# Patient Record
Sex: MALE | Race: WHITE | NOT HISPANIC OR LATINO | Employment: FULL TIME | URBAN - NONMETROPOLITAN AREA
[De-identification: names, ages, dates, MRNs, and addresses within clinical notes are randomized per-mention and may not be internally consistent; named-entity substitution may affect disease eponyms.]

---

## 2023-09-27 NOTE — PROGRESS NOTES
"Aneesh Loaiza is a 28 y.o. male who presents for Establish Care (Hx of high BP, not on medications at this time, would like to restart; discuss GERD medications; sleep study, waking up at night;)    HTN: previously on hydrochlorothiazide and amlodipine which he didn't like much. He checks his BP at home and his systolic has been around 145/90 on average.     GERD, h/o hiatal hernia repair (2017): On omeprazole otc which works well but he is requesting rx. He is getting breakthrough so he is considering increasing to BID. His hernia was undiagnosed for many years and so he developed esophageal ulcers.     Frequent waking, snoring: Sx getting worse over time. Strong Fhx of TYSHAWN and he wakes up a lot so he is hoping to get a sleep study to look into this.     Review of systems completed and unremarkable other than what is documented in HPI.     Social history: non-smoker, drinks alcohol rarely, he is working at [email or web address], he is paramedic in the ER  Medical history: GERD, HTN  Medications: omeprazole  SurgHx: cholecystectomy, hiatal hernia repair surgery  Fhx: father has heart disease, CONCHITA, DM, TYSHAWN, HTN, GERD, mom has HTN and GERD  Allergies: NKDA    Objective   /84 (BP Location: Right arm, Patient Position: Sitting, BP Cuff Size: Large adult)   Pulse 85   Ht 1.905 m (6' 3\")   Wt 122 kg (268 lb 3.2 oz)   SpO2 98%   BMI 33.52 kg/m²     Gen: No acute distress, alert and oriented x3, pleasant   HEENT: moist mucous membranes, b/l external auditory canals are clear of debris, TMs within normal limits, no oropharyngeal lesions, eomi, perrla   Neck: thyroid within normal limits, no lymphadenopathy   CV: RRR, normal S1/S2, no murmur   Resp: Clear to auscultation bilaterally, no wheezes or rhonchi appreciated  Abd: soft, nontender, non-distended, no guarding/rigidity, bowel sounds present  Extr: no edema, no calf tenderness  Derm: Skin is warm and dry, no rashes appreciated  Psych: mood is good, affect is congruent, " good hygiene, normal speech and eye contact  Neuro: cranial nerves grossly intact, normal gait    Assessment/Plan     #HTN:   Rx sent low dose amlodipine  Recheck in 6 mo    #GERD:   #Hiatal hernia s/p repair  Not controlled on otc omeprazole  Increasing the dosage    #Frequent waking  #snoring:   Ordering sleep study  Would want rx through [facility]    HCM:  Flu vaccine today

## 2023-10-05 ENCOUNTER — OFFICE VISIT (OUTPATIENT)
Dept: PRIMARY CARE | Facility: CLINIC | Age: 28
End: 2023-10-05
Payer: COMMERCIAL

## 2023-10-05 ENCOUNTER — LAB (OUTPATIENT)
Dept: LAB | Facility: LAB | Age: 28
End: 2023-10-05
Payer: COMMERCIAL

## 2023-10-05 VITALS
HEART RATE: 85 BPM | SYSTOLIC BLOOD PRESSURE: 131 MMHG | BODY MASS INDEX: 33.35 KG/M2 | OXYGEN SATURATION: 98 % | WEIGHT: 268.2 LBS | HEIGHT: 75 IN | DIASTOLIC BLOOD PRESSURE: 90 MMHG

## 2023-10-05 DIAGNOSIS — G47.19 EXCESSIVE DAYTIME SLEEPINESS: Primary | ICD-10-CM

## 2023-10-05 DIAGNOSIS — K21.9 GASTROESOPHAGEAL REFLUX DISEASE WITHOUT ESOPHAGITIS: ICD-10-CM

## 2023-10-05 DIAGNOSIS — I10 PRIMARY HYPERTENSION: ICD-10-CM

## 2023-10-05 DIAGNOSIS — Z23 ENCOUNTER FOR IMMUNIZATION: ICD-10-CM

## 2023-10-05 DIAGNOSIS — Z13.220 SCREENING FOR HYPERLIPIDEMIA: ICD-10-CM

## 2023-10-05 LAB
ALBUMIN SERPL BCP-MCNC: 4.7 G/DL (ref 3.4–5)
ALP SERPL-CCNC: 77 U/L (ref 33–120)
ALT SERPL W P-5'-P-CCNC: 23 U/L (ref 10–52)
ANION GAP SERPL CALC-SCNC: 13 MMOL/L (ref 10–20)
AST SERPL W P-5'-P-CCNC: 29 U/L (ref 9–39)
BASOPHILS # BLD AUTO: 0.07 X10*3/UL (ref 0–0.1)
BASOPHILS NFR BLD AUTO: 0.8 %
BILIRUB SERPL-MCNC: 0.5 MG/DL (ref 0–1.2)
BUN SERPL-MCNC: 6 MG/DL (ref 6–23)
CALCIUM SERPL-MCNC: 9.8 MG/DL (ref 8.6–10.3)
CHLORIDE SERPL-SCNC: 100 MMOL/L (ref 98–107)
CHOLEST SERPL-MCNC: 193 MG/DL (ref 0–199)
CHOLESTEROL/HDL RATIO: 5.8
CO2 SERPL-SCNC: 30 MMOL/L (ref 21–32)
CREAT SERPL-MCNC: 0.85 MG/DL (ref 0.5–1.3)
EOSINOPHIL # BLD AUTO: 0.12 X10*3/UL (ref 0–0.7)
EOSINOPHIL NFR BLD AUTO: 1.3 %
ERYTHROCYTE [DISTWIDTH] IN BLOOD BY AUTOMATED COUNT: 12.2 % (ref 11.5–14.5)
GFR SERPL CREATININE-BSD FRML MDRD: >90 ML/MIN/1.73M*2
GLUCOSE SERPL-MCNC: 94 MG/DL (ref 74–99)
HCT VFR BLD AUTO: 44.6 % (ref 41–52)
HDLC SERPL-MCNC: 33.4 MG/DL
HGB BLD-MCNC: 14.4 G/DL (ref 13.5–17.5)
IMM GRANULOCYTES # BLD AUTO: 0.02 X10*3/UL (ref 0–0.7)
IMM GRANULOCYTES NFR BLD AUTO: 0.2 % (ref 0–0.9)
LDLC SERPL CALC-MCNC: 107 MG/DL (ref 110–150)
LYMPHOCYTES # BLD AUTO: 2.71 X10*3/UL (ref 1.2–4.8)
LYMPHOCYTES NFR BLD AUTO: 29.1 %
MCH RBC QN AUTO: 26.6 PG (ref 26–34)
MCHC RBC AUTO-ENTMCNC: 32.3 G/DL (ref 32–36)
MCV RBC AUTO: 82 FL (ref 80–100)
MONOCYTES # BLD AUTO: 0.64 X10*3/UL (ref 0.1–1)
MONOCYTES NFR BLD AUTO: 6.9 %
NEUTROPHILS # BLD AUTO: 5.74 X10*3/UL (ref 1.2–7.7)
NEUTROPHILS NFR BLD AUTO: 61.7 %
NON HDL CHOLESTEROL: 160 MG/DL (ref 0–149)
NRBC BLD-RTO: 0 /100 WBCS (ref 0–0)
PLATELET # BLD AUTO: 222 X10*3/UL (ref 150–450)
PMV BLD AUTO: 11.4 FL (ref 7.5–11.5)
POTASSIUM SERPL-SCNC: 3.9 MMOL/L (ref 3.5–5.3)
PROT SERPL-MCNC: 7.7 G/DL (ref 6.4–8.2)
RBC # BLD AUTO: 5.42 X10*6/UL (ref 4.5–5.9)
SODIUM SERPL-SCNC: 139 MMOL/L (ref 136–145)
TRIGL SERPL-MCNC: 262 MG/DL (ref 0–149)
VLDL: 52 MG/DL (ref 0–40)
WBC # BLD AUTO: 9.3 X10*3/UL (ref 4.4–11.3)

## 2023-10-05 PROCEDURE — 85025 COMPLETE CBC W/AUTO DIFF WBC: CPT

## 2023-10-05 PROCEDURE — 3075F SYST BP GE 130 - 139MM HG: CPT | Performed by: FAMILY MEDICINE

## 2023-10-05 PROCEDURE — 36415 COLL VENOUS BLD VENIPUNCTURE: CPT

## 2023-10-05 PROCEDURE — 80061 LIPID PANEL: CPT

## 2023-10-05 PROCEDURE — 90471 IMMUNIZATION ADMIN: CPT | Performed by: FAMILY MEDICINE

## 2023-10-05 PROCEDURE — 3080F DIAST BP >= 90 MM HG: CPT | Performed by: FAMILY MEDICINE

## 2023-10-05 PROCEDURE — 99204 OFFICE O/P NEW MOD 45 MIN: CPT | Performed by: FAMILY MEDICINE

## 2023-10-05 PROCEDURE — 90686 IIV4 VACC NO PRSV 0.5 ML IM: CPT | Performed by: FAMILY MEDICINE

## 2023-10-05 PROCEDURE — 80053 COMPREHEN METABOLIC PANEL: CPT

## 2023-10-05 RX ORDER — OMEPRAZOLE 20 MG/1
20 TABLET, DELAYED RELEASE ORAL
COMMUNITY
End: 2024-04-05 | Stop reason: WASHOUT

## 2023-10-05 RX ORDER — AMLODIPINE BESYLATE 5 MG/1
5 TABLET ORAL DAILY
Qty: 90 TABLET | Refills: 3 | Status: SHIPPED | OUTPATIENT
Start: 2023-10-05 | End: 2024-10-04

## 2023-10-05 RX ORDER — OMEPRAZOLE 40 MG/1
40 CAPSULE, DELAYED RELEASE ORAL
Qty: 90 CAPSULE | Refills: 3 | Status: SHIPPED | OUTPATIENT
Start: 2023-10-05 | End: 2024-10-04

## 2023-10-05 NOTE — PATIENT INSTRUCTIONS
[facility] [phone]    Sleep Medicine:  Alicia Valenzuela () [phone]  Mik Augustin ([facility]) [phone]

## 2024-01-02 DIAGNOSIS — Z31.69 INFERTILITY COUNSELING: Primary | ICD-10-CM

## 2024-03-29 DIAGNOSIS — R06.83 SNORING: Primary | ICD-10-CM

## 2024-04-05 ENCOUNTER — OFFICE VISIT (OUTPATIENT)
Dept: PRIMARY CARE | Facility: CLINIC | Age: 29
End: 2024-04-05
Payer: COMMERCIAL

## 2024-04-05 ENCOUNTER — LAB (OUTPATIENT)
Dept: LAB | Facility: LAB | Age: 29
End: 2024-04-05
Payer: COMMERCIAL

## 2024-04-05 VITALS
WEIGHT: 271 LBS | BODY MASS INDEX: 33.87 KG/M2 | DIASTOLIC BLOOD PRESSURE: 82 MMHG | HEART RATE: 75 BPM | SYSTOLIC BLOOD PRESSURE: 124 MMHG

## 2024-04-05 DIAGNOSIS — R68.82 LOW LIBIDO: Primary | ICD-10-CM

## 2024-04-05 DIAGNOSIS — R68.82 LOW LIBIDO: ICD-10-CM

## 2024-04-05 LAB
IRON SERPL-MCNC: 30 UG/DL (ref 35–150)
TSH SERPL-ACNC: 2.64 MIU/L (ref 0.44–3.98)

## 2024-04-05 PROCEDURE — 36415 COLL VENOUS BLD VENIPUNCTURE: CPT

## 2024-04-05 PROCEDURE — 84443 ASSAY THYROID STIM HORMONE: CPT

## 2024-04-05 PROCEDURE — 1036F TOBACCO NON-USER: CPT | Performed by: FAMILY MEDICINE

## 2024-04-05 PROCEDURE — 83540 ASSAY OF IRON: CPT

## 2024-04-05 PROCEDURE — 84403 ASSAY OF TOTAL TESTOSTERONE: CPT

## 2024-04-05 PROCEDURE — 99214 OFFICE O/P EST MOD 30 MIN: CPT | Performed by: FAMILY MEDICINE

## 2024-04-05 RX ORDER — SILDENAFIL 25 MG/1
25 TABLET, FILM COATED ORAL DAILY PRN
Qty: 10 TABLET | Refills: 1 | Status: SHIPPED | OUTPATIENT
Start: 2024-04-05 | End: 2024-05-05

## 2024-04-05 RX ORDER — OXYCODONE AND ACETAMINOPHEN 5; 325 MG/1; MG/1
1 TABLET ORAL EVERY 6 HOURS PRN
COMMUNITY
Start: 2024-02-20 | End: 2024-04-05 | Stop reason: ALTCHOICE

## 2024-04-06 LAB — TESTOST SERPL-MCNC: 207 NG/DL (ref 240–1000)

## 2024-04-09 DIAGNOSIS — E61.1 IRON DEFICIENCY: Primary | ICD-10-CM

## 2024-04-09 RX ORDER — ASCORBIC ACID 500 MG
500 TABLET ORAL DAILY
Qty: 30 TABLET | Refills: 1 | Status: SHIPPED | OUTPATIENT
Start: 2024-04-09 | End: 2025-04-09

## 2024-04-09 RX ORDER — FERROUS SULFATE 325(65) MG
325 TABLET, DELAYED RELEASE (ENTERIC COATED) ORAL
Qty: 30 TABLET | Refills: 1 | Status: SHIPPED | OUTPATIENT
Start: 2024-04-09 | End: 2024-06-08

## 2024-05-07 ENCOUNTER — CLINICAL SUPPORT (OUTPATIENT)
Dept: SLEEP MEDICINE | Facility: CLINIC | Age: 29
End: 2024-05-07
Payer: COMMERCIAL

## 2024-05-07 DIAGNOSIS — G47.19 EXCESSIVE DAYTIME SLEEPINESS: ICD-10-CM

## 2024-05-07 DIAGNOSIS — R06.83 SNORING: ICD-10-CM

## 2024-05-07 PROCEDURE — 95811 POLYSOM 6/>YRS CPAP 4/> PARM: CPT | Performed by: PSYCHIATRY & NEUROLOGY

## 2024-05-08 NOTE — PROGRESS NOTES
"[facility] Orchestrate NOTE:     Patient: Aneesh Loaiza   MRN//AGE: [id number]  1995  29 y.o.   Technologist: BHAVANA Vora   Room: 105   Service Date: 2024        Sleep Testing Location: [facility]:     TECHNOLOGIST SLEEP STUDY PROCEDURE NOTE:   This sleep study is being conducted according to the policies and procedures outlined by the AAS accreditation standards.  The sleep study procedure and processes involved during this appointment was explained to the patient/patient’s family, questions were answered. The patient/family verbalized understanding.      The patient is a 29 y.o. year old male scheduled for a Diagnostic PSG Split night with montage of:  diagnostic . he arrived for his appointment.      The study that was ultimately completed was a CPAP titration.    The full study Was completed.  Patient questionnaires completed?: yes     Consents signed? yes    Initial Fall Risk Screening:     Aneesh has not fallen in the last 6 months. Aneesh does not have a fear of falling. He does not need assistance with sitting, standing, or walking. he does not need assistance walking in his home. he does not need assistance in an unfamiliar setting. The patient is not using an assistive device.     Brief Study observations: Patient met criteria for split study as well as priority scoring.     Deviation to order/protocol and reason: No      If PAP, which was preferred mask/pressure/mode: Resmed N20 Airfit Nasal-Lg,\"S\"-, Bilevel      Other:None    After the procedure, the patient/family was informed to ensure followup with ordering clinician for testing results.      Technologist: BHAVANA Vora    "

## 2024-07-09 ENCOUNTER — APPOINTMENT (OUTPATIENT)
Dept: UROLOGY | Facility: HOSPITAL | Age: 29
End: 2024-07-09
Payer: COMMERCIAL

## 2024-07-12 ENCOUNTER — APPOINTMENT (OUTPATIENT)
Dept: UROLOGY | Facility: CLINIC | Age: 29
End: 2024-07-12
Payer: COMMERCIAL

## 2024-07-16 ENCOUNTER — APPOINTMENT (OUTPATIENT)
Dept: UROLOGY | Facility: HOSPITAL | Age: 29
End: 2024-07-16
Payer: COMMERCIAL

## 2024-07-16 DIAGNOSIS — R79.89 LOW TESTOSTERONE: Primary | ICD-10-CM

## 2024-07-16 PROCEDURE — 99214 OFFICE O/P EST MOD 30 MIN: CPT | Performed by: STUDENT IN AN ORGANIZED HEALTH CARE EDUCATION/TRAINING PROGRAM

## 2024-07-16 PROCEDURE — 99204 OFFICE O/P NEW MOD 45 MIN: CPT | Performed by: STUDENT IN AN ORGANIZED HEALTH CARE EDUCATION/TRAINING PROGRAM

## 2024-07-16 NOTE — PROGRESS NOTES
"Subjective   Patient ID: Aneesh Loaiza \"BASIA\" is a 29 y.o. male    HPI  29 y.o. male who presents with concerns about low testosterone levels and decreased libido. He reports having a partner and is planning to get  in September. Recent lab work from his primary care physician showed a testosterone level of 207 ng/dL, which is slightly below the normal range (240-950 ng/dL). The test was conducted at 9:42 AM, which is later than the recommended time for accurate testosterone measurement. The patient is interested in having biological children in the future, so testosterone replacement therapy is not an option due to its potential impact on sperm production. He currently takes 10 mg of sildenafil as needed for occasional difficulty in achieving erections. The patient is also interested in natural ways to boost testosterone levels, such as losing fat and gaining muscle. A semen analysis has been ordered to assess fertility.      The most recent total testosterone, conducted on 4/5/2024, revealed:  207       Review of Systems    All systems were reviewed. Anything negative was noted in the HPI.    Objective   Physical Exam    General: Well developed, well nourished, alert and cooperative, appears in no acute distress   Eyes: Non-injected conjunctiva, sclera clear, no proptosis   Cardiac: Extremities are warm and well perfused. No edema, cyanosis or pallor   Lungs: Breathing is easy, non-labored. Speaking in clear and complete sentences. Normal diaphragmatic movement   MSK: Ambulatory with steady gait, unassisted   Neuro: Alert and oriented to person, place, and time   Psych: Demonstrates good judgment and reason, without hallucinations, abnormal affect or abnormal behaviors   Skin: No obvious lesions, no rashes       No CVA tenderness bilaterally   No suprapubic pain or discomfort       No past medical history on file.      No past surgical history on file.        Assessment/Plan    Hypogonadism, Erectile " Dysfunction    29 y.o. male who presents for the above condition, We had a long and extensive discussion with the patient regarding hypogonadism I explained to him that since the patient had to low level below the reference range of testosterone that he might have hypogonadism. We had an extensive explanation about the pathophysiology, risk factor, differential diagnosis freezing and management of hypogonadism. We discussed testosterone replacement therapy I explained at length to the patient the risks of testosterone replacement therapy. We discussed the correlation of testosterone replacement therapy with prostate cancer explained to him that he might be at high risk of detecting prostate cancer and this is need to follow-up closely his PSA and always make sure that is not increasing or having a high doubling time. Patient does not have any family history of prostate cancer. We also discussed cardiac events with TRT including MRIs, heart failure, PEs. Discussed with the patient that many studies so far reporting a small high risk of cardiac events with TRT. We discussed the black box warning of TRT. Also discussed the risks of polycythemia and the increased risk for stroke in case his hematocrit was above 52. Patient verbalized understanding of all the risk and benefit of this on physical therapy and he wants to proceed. We discussed the different formulation including testosterone gel, testosterone transdermal patches, IM injection and oral testosterone. Informed him that oral testosterone is a relatively new medication on the market. We discussed Jatenzo as an oral formulation of testosterone. Explained all the risk benefit potential complication of the medication and the black box warning of hypertension. However I explained to the patient that his result of total testosterone is low normal and not deficient. Explained to him that he needs to repeat his total testosterone level and observe the trend of the  value. If the value is still trending downwards below the reference range 10 he will be considered for TRT treatment. However if not the risk might outweigh the benefit.     Plan:  - Follow up in 1 month with:   - Semen analysis   - Testosterone free and total        7/16/2024    Scribe Attestation  By signing my name below, ICasimiro Scribe   attest that this documentation has been prepared under the direction and in the presence of Dr. Monty Siu

## 2024-09-16 ENCOUNTER — LAB (OUTPATIENT)
Dept: LAB | Facility: LAB | Age: 29
End: 2024-09-16
Payer: COMMERCIAL

## 2024-09-16 DIAGNOSIS — R79.89 LOW TESTOSTERONE: ICD-10-CM

## 2024-09-16 LAB — COTININE UR QL SCN: NEGATIVE

## 2024-09-16 PROCEDURE — 36415 COLL VENOUS BLD VENIPUNCTURE: CPT

## 2024-09-16 PROCEDURE — 84402 ASSAY OF FREE TESTOSTERONE: CPT

## 2024-09-16 NOTE — PROGRESS NOTES
"Aneesh Loaiza \"UZMA" is a 28 y.o. male who presents for Follow-up (High BP is still an issue, he checks at start of work shift and end of shift, 148ish diastolic;  )    Low libido, ED: Wants to get testing. Unsure where to start the process. He has dealt with this for a while.     HTN: previously on hydrochlorothiazide and amlodipine which he didn't like much. He checks his BP at home and his systolic has been around 145/90 on average.      GERD, h/o hiatal hernia repair (2017): On omeprazole otc which works well but he is requesting rx. He is getting breakthrough so he is considering increasing to BID. His hernia was undiagnosed for many years and so he developed esophageal ulcers.      Frequent waking, snoring: Sx getting worse over time. Strong Fhx of TYSHAWN and he wakes up a lot so he is hoping to get a sleep study to look into this.      Review of systems completed and unremarkable other than what is documented in HPI.    Objective   /86 (BP Location: Right arm, Patient Position: Sitting, BP Cuff Size: Large adult)   Pulse 75   Wt 123 kg (271 lb)   BMI 33.87 kg/m²     Gen: No acute distress, alert and oriented x3, pleasant   HEENT: moist mucous membranes, b/l external auditory canals are clear of debris, TMs within normal limits, no oropharyngeal lesions, eomi, perrla   Neck: thyroid within normal limits, no lymphadenopathy   CV: RRR, normal S1/S2, no murmur   Resp: Clear to auscultation bilaterally, no wheezes or rhonchi appreciated  Abd: soft, nontender, non-distended, no guarding/rigidity, bowel sounds present  Extr: no edema, no calf tenderness  Derm: Skin is warm and dry, no rashes appreciated  Psych: mood is good, affect is congruent, good hygiene, normal speech and eye contact  Neuro: cranial nerves grossly intact, normal gait    Assessment/Plan     #Low libido  #ED  Check labs  Trial sildenafil    #HTN:   Controlled on low dose amlodipine     #GERD:   #Hiatal hernia s/p repair  Not controlled on " Patient brought by ambulance for evaluation of blood in the urine as well as his stool.     otc omeprazole  Increasing the dosage     #Frequent waking  #Snoring:   Ordering sleep study  Would want rx through [facility]     HCM:  UTD for flu

## 2024-09-19 LAB
TESTOSTERONE FREE (CHAN): 69.3 PG/ML (ref 35–155)
TESTOSTERONE,TOTAL,LC-MS/MS: 283 NG/DL (ref 250–1100)

## 2024-09-25 ENCOUNTER — APPOINTMENT (OUTPATIENT)
Dept: ENDOCRINOLOGY | Facility: CLINIC | Age: 29
End: 2024-09-25
Payer: COMMERCIAL

## 2024-09-26 ENCOUNTER — APPOINTMENT (OUTPATIENT)
Dept: ENDOCRINOLOGY | Facility: CLINIC | Age: 29
End: 2024-09-26
Payer: COMMERCIAL

## 2024-09-27 ENCOUNTER — OFFICE VISIT (OUTPATIENT)
Dept: URGENT CARE | Facility: URGENT CARE | Age: 29
End: 2024-09-27
Payer: COMMERCIAL

## 2024-09-27 VITALS
OXYGEN SATURATION: 98 % | HEART RATE: 86 BPM | SYSTOLIC BLOOD PRESSURE: 135 MMHG | RESPIRATION RATE: 17 BRPM | TEMPERATURE: 98.1 F | WEIGHT: 287.48 LBS | BODY MASS INDEX: 35.74 KG/M2 | DIASTOLIC BLOOD PRESSURE: 86 MMHG | HEIGHT: 75 IN

## 2024-09-27 DIAGNOSIS — H69.93 ETD (EUSTACHIAN TUBE DYSFUNCTION), BILATERAL: Primary | ICD-10-CM

## 2024-09-27 DIAGNOSIS — J30.89 SEASONAL ALLERGIC RHINITIS DUE TO OTHER ALLERGIC TRIGGER: ICD-10-CM

## 2024-09-27 NOTE — PROGRESS NOTES
"Subjective   Patient ID: Aneesh Loaiza \"BASIA\" is a 29 y.o. male. They present today with a chief complaint of Earache (RIGHT ear pain x 1 day).    History of Present Illness    Earache       Pt presents with ear pain. Hx of ear infection. He denies fever or sore throat or cough.     Past Medical History  Allergies as of 09/27/2024 - Reviewed 09/27/2024   Allergen Reaction Noted    Amoxicillin-pot clavulanate Hives 11/29/2017       (Not in a hospital admission)       No past medical history on file.    No past surgical history on file.     reports that he has quit smoking. His smoking use included cigarettes. He has been exposed to tobacco smoke. He has never used smokeless tobacco. He reports current alcohol use. He reports that he does not use drugs.    Review of Systems  Review of Systems   HENT:  Positive for ear pain.                                   Objective    Vitals:    09/27/24 0930   BP: 135/86   BP Location: Left arm   Pulse: 86   Resp: 17   Temp: 36.7 °C (98.1 °F)   TempSrc: Oral   SpO2: 98%   Weight: 130 kg (287 lb 7.7 oz)   Height: 1.905 m (6' 3\")     No LMP for male patient.    Physical Exam  Vitals and nursing note reviewed.   Constitutional:       Appearance: Normal appearance.   HENT:      Head: Normocephalic and atraumatic.      Right Ear: Tympanic membrane, ear canal and external ear normal.      Left Ear: Tympanic membrane, ear canal and external ear normal.      Nose: Nose normal.      Comments: Pale edematous turbinates     Mouth/Throat:      Mouth: Mucous membranes are moist.      Pharynx: Posterior oropharyngeal erythema present.      Comments: Mild post nasal drainage with cobblestone on pharynx  Eyes:      Extraocular Movements: Extraocular movements intact.      Conjunctiva/sclera: Conjunctivae normal.      Pupils: Pupils are equal, round, and reactive to light.   Cardiovascular:      Rate and Rhythm: Normal rate and regular rhythm.      Heart sounds: No murmur heard.  Pulmonary:      " Effort: Pulmonary effort is normal.      Breath sounds: Normal breath sounds. No wheezing or rhonchi.   Musculoskeletal:      Cervical back: Normal range of motion and neck supple.   Lymphadenopathy:      Cervical: No cervical adenopathy.   Skin:     General: Skin is warm and dry.   Neurological:      General: No focal deficit present.      Mental Status: He is alert and oriented to person, place, and time.   Psychiatric:         Mood and Affect: Mood normal.         Procedures    Point of Care Test & Imaging Results from this visit  No results found for this visit on 09/27/24.   No results found.    Diagnostic study results (if any) were reviewed by Kenya Renee PA-C.    Assessment/Plan   Allergies, medications, history, and pertinent labs/EKGs/Imaging reviewed by Kenya Renee PA-C.     Orders and Diagnoses  Diagnoses and all orders for this visit:  ETD (Eustachian tube dysfunction), bilateral  Seasonal allergic rhinitis due to other allergic trigger    30 yo M presents with ear pain x 1 day. No fever. Discussed treatment with otc claritin 10mg daily x 1 month and trial of flonase (fluticasone) 1 spray each nostril twice a day x 3-7 days. May consider saline nasal spray as needed. Return if fever or worsening pain or new symptoms of sore throat or cough.      Patient disposition: Home    Electronically signed by Kenya Renee PA-C  9:40 AM

## 2024-10-07 ENCOUNTER — APPOINTMENT (OUTPATIENT)
Dept: PRIMARY CARE | Facility: CLINIC | Age: 29
End: 2024-10-07
Payer: COMMERCIAL

## 2024-10-08 ENCOUNTER — OFFICE VISIT (OUTPATIENT)
Dept: UROLOGY | Facility: HOSPITAL | Age: 29
End: 2024-10-08
Payer: COMMERCIAL

## 2024-10-08 DIAGNOSIS — R79.89 LOW TESTOSTERONE: Primary | ICD-10-CM

## 2024-10-08 PROCEDURE — 99214 OFFICE O/P EST MOD 30 MIN: CPT | Performed by: STUDENT IN AN ORGANIZED HEALTH CARE EDUCATION/TRAINING PROGRAM

## 2024-10-08 ASSESSMENT — PAIN SCALES - GENERAL: PAINLEVEL: 0-NO PAIN

## 2024-10-09 ENCOUNTER — ANCILLARY PROCEDURE (OUTPATIENT)
Dept: ENDOCRINOLOGY | Facility: CLINIC | Age: 29
End: 2024-10-09
Payer: COMMERCIAL

## 2024-10-09 DIAGNOSIS — R79.89 LOW TESTOSTERONE: ICD-10-CM

## 2024-10-09 LAB
% EX RESIDUAL CYTOPLASM (SEMEN): 0.8 %
% HEAD DEFECTS (SEMEN): 97.5 %
% NECK MIDPIECE (SEMEN): 20.8 %
% NORMAL (SEMEN): 2 % (ref 4–?)
% TAIL DEFECTS (SEMEN): 2.3 %
ABSTINENCE (DAYS): 4 DAYS (ref 2–7)
AGGLUTINATION (SEMEN): NO
ANALYZED TIME:: ABNORMAL
ANDROLOGY LAB ID#: ABNORMAL
CLUMPS (SEMEN): NO
COLLECTED COMPLETELY: YES
COLLECTION LOCATION:: ABNORMAL
COLLECTION METHOD:: ABNORMAL
CONCENTRATION(SEMEN): 6.05 MILL/ML (ref 15–?)
DEBRIS (SEMEN): YES
LEUKOCYTE (SEMEN): NEGATIVE
NON PROG. MOTILITY (SEMEN): 11 %
PROG. MOTILITY (SEMEN): 53 % (ref 32–?)
RECEIVED TIME:: ABNORMAL
REI PARTNER DOB: ABNORMAL
REI PARTNER NAME: ABNORMAL
SEMEN APPEARANCE: NORMAL
SEMEN LIQUEFACTION: NORMAL
SEMEN VISCOSITY: NORMAL
TOT. NO OF NORM. MOTILE SPERM (SEMEN): 0.19 MILL
TOT. NO OF NORM. SPERM (SEMEN): 0.3 MILL
TOTAL MOTILITY (SEMEN): 64 % (ref 40–?)
TOTAL NO OF MOTILE (SEMEN): 9.67 MILL
TOTAL NO OF RND CELLS (SEMEN): 1.2 MILL (ref ?–5)
TOTAL NO OF SPERM (SEMEN): 15.13 MILL (ref 39–?)
VOLUME (SEMEN): 2.5 ML (ref 1.5–?)

## 2024-10-09 PROCEDURE — 89322 SEMEN ANAL STRICT CRITERIA: CPT | Performed by: OBSTETRICS & GYNECOLOGY

## 2024-10-11 NOTE — PROGRESS NOTES
29 y.o. male who presents with concerns about low testosterone levels and decreased libido. He reports having a partner and is planning to get  in September. Recent lab work from his primary care physician showed a testosterone level of 207 ng/dL, which is slightly below the normal range (240-950 ng/dL). The test was conducted at 9:42 AM, which is later than the recommended time for accurate testosterone measurement. The patient is interested in having biological children in the future, so testosterone replacement therapy is not an option due to its potential impact on sperm production. He currently takes 10 mg of sildenafil as needed for occasional difficulty in achieving erections. The patient is also interested in natural ways to boost testosterone levels, such as losing fat and gaining muscle. A semen analysis has been ordered to assess fertility.        The most recent total testosterone, conducted on 9/15/2024, revealed: 283  Previous Test in April 2024 was 207     Semen analysis pending.     Review of Systems     All systems were reviewed. Anything negative was noted in the HPI.     Objective   Physical Exam     General: Well developed, well nourished, alert and cooperative, appears in no acute distress   Eyes: Non-injected conjunctiva, sclera clear, no proptosis   Cardiac: Extremities are warm and well perfused. No edema, cyanosis or pallor   Lungs: Breathing is easy, non-labored. Speaking in clear and complete sentences. Normal diaphragmatic movement   MSK: Ambulatory with steady gait, unassisted   Neuro: Alert and oriented to person, place, and time   Psych: Demonstrates good judgment and reason, without hallucinations, abnormal affect or abnormal behaviors   Skin: No obvious lesions, no rashes       No CVA tenderness bilaterally   No suprapubic pain or discomfort         Medical History   No past medical history on file.           Surgical History   No past surgical history on file.                  Assessment/Plan  Hypogonadism improving T levels, Erectile Dysfunction     29 y.o. male who presents for the above condition, We had a long and extensive discussion with the patient regarding hypogonadism I explained to him that since the patient had to low level below the reference range of testosterone that he might have hypogonadism. We had an extensive explanation about the pathophysiology, risk factor, differential diagnosis freezing and management of hypogonadism. We discussed testosterone replacement therapy I explained at length to the patient the risks of testosterone replacement therapy. We discussed the correlation of testosterone replacement therapy with prostate cancer explained to him that he might be at high risk of detecting prostate cancer and this is need to follow-up closely his PSA and always make sure that is not increasing or having a high doubling time. Patient does not have any family history of prostate cancer. We also discussed cardiac events with TRT including MRIs, heart failure, PEs. Discussed with the patient that many studies so far reporting a small high risk of cardiac events with TRT. We discussed the black box warning of TRT. Also discussed the risks of polycythemia and the increased risk for stroke in case his hematocrit was above 52. Patient verbalized understanding of all the risk and benefit of this on physical therapy and he wants to proceed. We discussed the different formulation including testosterone gel, testosterone transdermal patches, IM injection and oral testosterone. Informed him that oral testosterone is a relatively new medication on the market. We discussed Jatenzo as an oral formulation of testosterone. Explained all the risk benefit potential complication of the medication and the black box warning of hypertension. However I explained to the patient that his result of total testosterone is low normal and not deficient. Explained to him that he needs to repeat  his total testosterone level and observe the trend of the value. If the value is still trending downwards below the reference range 10 he will be considered for TRT treatment. However if not the risk might outweigh the benefit.     Plan:  - Follow up in 1 month with:              - Semen analysis  Sildenafil 25mg PRN

## 2024-11-06 DIAGNOSIS — K21.9 GASTROESOPHAGEAL REFLUX DISEASE WITHOUT ESOPHAGITIS: ICD-10-CM

## 2024-11-06 DIAGNOSIS — I10 PRIMARY HYPERTENSION: ICD-10-CM

## 2024-11-06 RX ORDER — OMEPRAZOLE 40 MG/1
CAPSULE, DELAYED RELEASE ORAL
Qty: 30 CAPSULE | Refills: 0 | Status: SHIPPED | OUTPATIENT
Start: 2024-11-06

## 2024-11-06 RX ORDER — AMLODIPINE BESYLATE 5 MG/1
5 TABLET ORAL DAILY
Qty: 30 TABLET | Refills: 0 | Status: SHIPPED | OUTPATIENT
Start: 2024-11-06

## 2024-12-08 ENCOUNTER — HOSPITAL ENCOUNTER (EMERGENCY)
Facility: HOSPITAL | Age: 29
Discharge: HOME | End: 2024-12-08
Attending: STUDENT IN AN ORGANIZED HEALTH CARE EDUCATION/TRAINING PROGRAM
Payer: COMMERCIAL

## 2024-12-08 VITALS
DIASTOLIC BLOOD PRESSURE: 92 MMHG | OXYGEN SATURATION: 99 % | TEMPERATURE: 97.5 F | WEIGHT: 287.92 LBS | HEIGHT: 75 IN | SYSTOLIC BLOOD PRESSURE: 143 MMHG | HEART RATE: 114 BPM | BODY MASS INDEX: 35.8 KG/M2 | RESPIRATION RATE: 16 BRPM

## 2024-12-08 DIAGNOSIS — K52.9 GASTROENTERITIS: Primary | ICD-10-CM

## 2024-12-08 LAB
ALBUMIN SERPL BCP-MCNC: 4.5 G/DL (ref 3.4–5)
ALP SERPL-CCNC: 80 U/L (ref 33–120)
ALT SERPL W P-5'-P-CCNC: 41 U/L (ref 10–52)
ANION GAP SERPL CALCULATED.3IONS-SCNC: 15 MMOL/L (ref 10–20)
AST SERPL W P-5'-P-CCNC: 54 U/L (ref 9–39)
BASOPHILS # BLD AUTO: 0.04 X10*3/UL (ref 0–0.1)
BASOPHILS NFR BLD AUTO: 0.3 %
BILIRUB SERPL-MCNC: 0.8 MG/DL (ref 0–1.2)
BUN SERPL-MCNC: 13 MG/DL (ref 6–23)
CALCIUM SERPL-MCNC: 9 MG/DL (ref 8.6–10.3)
CHLORIDE SERPL-SCNC: 101 MMOL/L (ref 98–107)
CO2 SERPL-SCNC: 25 MMOL/L (ref 21–32)
CREAT SERPL-MCNC: 0.87 MG/DL (ref 0.5–1.3)
EGFRCR SERPLBLD CKD-EPI 2021: >90 ML/MIN/1.73M*2
EOSINOPHIL # BLD AUTO: 0.09 X10*3/UL (ref 0–0.7)
EOSINOPHIL NFR BLD AUTO: 0.7 %
ERYTHROCYTE [DISTWIDTH] IN BLOOD BY AUTOMATED COUNT: 14.6 % (ref 11.5–14.5)
GLUCOSE SERPL-MCNC: 176 MG/DL (ref 74–99)
HCT VFR BLD AUTO: 45.6 % (ref 41–52)
HGB BLD-MCNC: 14.7 G/DL (ref 13.5–17.5)
IMM GRANULOCYTES # BLD AUTO: 0.05 X10*3/UL (ref 0–0.7)
IMM GRANULOCYTES NFR BLD AUTO: 0.4 % (ref 0–0.9)
LACTATE SERPL-SCNC: 1.7 MMOL/L (ref 0.4–2)
LACTATE SERPL-SCNC: 2.1 MMOL/L (ref 0.4–2)
LIPASE SERPL-CCNC: 25 U/L (ref 9–82)
LYMPHOCYTES # BLD AUTO: 0.73 X10*3/UL (ref 1.2–4.8)
LYMPHOCYTES NFR BLD AUTO: 5.3 %
MCH RBC QN AUTO: 24.7 PG (ref 26–34)
MCHC RBC AUTO-ENTMCNC: 32.2 G/DL (ref 32–36)
MCV RBC AUTO: 77 FL (ref 80–100)
MONOCYTES # BLD AUTO: 0.61 X10*3/UL (ref 0.1–1)
MONOCYTES NFR BLD AUTO: 4.4 %
NEUTROPHILS # BLD AUTO: 12.29 X10*3/UL (ref 1.2–7.7)
NEUTROPHILS NFR BLD AUTO: 88.9 %
NRBC BLD-RTO: 0 /100 WBCS (ref 0–0)
PLATELET # BLD AUTO: 204 X10*3/UL (ref 150–450)
POTASSIUM SERPL-SCNC: 3.4 MMOL/L (ref 3.5–5.3)
PROT SERPL-MCNC: 7.7 G/DL (ref 6.4–8.2)
RBC # BLD AUTO: 5.94 X10*6/UL (ref 4.5–5.9)
SODIUM SERPL-SCNC: 138 MMOL/L (ref 136–145)
WBC # BLD AUTO: 13.8 X10*3/UL (ref 4.4–11.3)

## 2024-12-08 PROCEDURE — 99284 EMERGENCY DEPT VISIT MOD MDM: CPT | Performed by: STUDENT IN AN ORGANIZED HEALTH CARE EDUCATION/TRAINING PROGRAM

## 2024-12-08 PROCEDURE — 2500000004 HC RX 250 GENERAL PHARMACY W/ HCPCS (ALT 636 FOR OP/ED): Performed by: EMERGENCY MEDICINE

## 2024-12-08 PROCEDURE — 96361 HYDRATE IV INFUSION ADD-ON: CPT

## 2024-12-08 PROCEDURE — 96374 THER/PROPH/DIAG INJ IV PUSH: CPT

## 2024-12-08 PROCEDURE — 36415 COLL VENOUS BLD VENIPUNCTURE: CPT | Performed by: STUDENT IN AN ORGANIZED HEALTH CARE EDUCATION/TRAINING PROGRAM

## 2024-12-08 PROCEDURE — 96372 THER/PROPH/DIAG INJ SC/IM: CPT | Performed by: STUDENT IN AN ORGANIZED HEALTH CARE EDUCATION/TRAINING PROGRAM

## 2024-12-08 PROCEDURE — 2500000004 HC RX 250 GENERAL PHARMACY W/ HCPCS (ALT 636 FOR OP/ED): Performed by: STUDENT IN AN ORGANIZED HEALTH CARE EDUCATION/TRAINING PROGRAM

## 2024-12-08 PROCEDURE — 84075 ASSAY ALKALINE PHOSPHATASE: CPT | Performed by: STUDENT IN AN ORGANIZED HEALTH CARE EDUCATION/TRAINING PROGRAM

## 2024-12-08 PROCEDURE — 83605 ASSAY OF LACTIC ACID: CPT | Performed by: STUDENT IN AN ORGANIZED HEALTH CARE EDUCATION/TRAINING PROGRAM

## 2024-12-08 PROCEDURE — 83690 ASSAY OF LIPASE: CPT | Performed by: STUDENT IN AN ORGANIZED HEALTH CARE EDUCATION/TRAINING PROGRAM

## 2024-12-08 PROCEDURE — 96375 TX/PRO/DX INJ NEW DRUG ADDON: CPT

## 2024-12-08 PROCEDURE — 85025 COMPLETE CBC W/AUTO DIFF WBC: CPT | Performed by: STUDENT IN AN ORGANIZED HEALTH CARE EDUCATION/TRAINING PROGRAM

## 2024-12-08 RX ORDER — ONDANSETRON 4 MG/1
4 TABLET, ORALLY DISINTEGRATING ORAL EVERY 8 HOURS PRN
Qty: 20 TABLET | Refills: 0 | Status: SHIPPED | OUTPATIENT
Start: 2024-12-08 | End: 2024-12-15

## 2024-12-08 RX ORDER — DICYCLOMINE HYDROCHLORIDE 10 MG/ML
20 INJECTION INTRAMUSCULAR ONCE
Status: COMPLETED | OUTPATIENT
Start: 2024-12-08 | End: 2024-12-08

## 2024-12-08 RX ORDER — PROCHLORPERAZINE EDISYLATE 5 MG/ML
10 INJECTION INTRAMUSCULAR; INTRAVENOUS ONCE
Status: COMPLETED | OUTPATIENT
Start: 2024-12-08 | End: 2024-12-08

## 2024-12-08 RX ORDER — ONDANSETRON HYDROCHLORIDE 2 MG/ML
4 INJECTION, SOLUTION INTRAVENOUS ONCE
Status: COMPLETED | OUTPATIENT
Start: 2024-12-08 | End: 2024-12-08

## 2024-12-08 RX ORDER — KETOROLAC TROMETHAMINE 30 MG/ML
15 INJECTION, SOLUTION INTRAMUSCULAR; INTRAVENOUS ONCE
Status: COMPLETED | OUTPATIENT
Start: 2024-12-08 | End: 2024-12-08

## 2024-12-08 ASSESSMENT — PAIN DESCRIPTION - LOCATION
LOCATION: ABDOMEN
LOCATION: ABDOMEN

## 2024-12-08 ASSESSMENT — COLUMBIA-SUICIDE SEVERITY RATING SCALE - C-SSRS
2. HAVE YOU ACTUALLY HAD ANY THOUGHTS OF KILLING YOURSELF?: NO
6. HAVE YOU EVER DONE ANYTHING, STARTED TO DO ANYTHING, OR PREPARED TO DO ANYTHING TO END YOUR LIFE?: NO
1. IN THE PAST MONTH, HAVE YOU WISHED YOU WERE DEAD OR WISHED YOU COULD GO TO SLEEP AND NOT WAKE UP?: NO

## 2024-12-08 ASSESSMENT — PAIN - FUNCTIONAL ASSESSMENT
PAIN_FUNCTIONAL_ASSESSMENT: 0-10

## 2024-12-08 ASSESSMENT — PAIN SCALES - GENERAL
PAINLEVEL_OUTOF10: 2
PAINLEVEL_OUTOF10: 6
PAINLEVEL_OUTOF10: 4

## 2024-12-08 NOTE — Clinical Note
Aneesh Loaiza was seen and treated in our emergency department on 12/8/2024.  He may return to work on 12/10/2024.       If you have any questions or concerns, please don't hesitate to call.      Gale Mcnamara, DO

## 2024-12-08 NOTE — ED PROVIDER NOTES
Patient was received in signout at 6:05 AM.     IN BRIEF    29M presents with nausea, vomiting, and diarrhea.  At the time of handoff he is mildly elevated white count.  Awaiting lab results and improvement of symptoms.       ED COURSE     ED Course as of 12/08/24 1237   Sun Dec 08, 2024   0734 Patient is feeling improved and able to tolerate Powerade.  Will recheck lactate, which if improved patient is comfortable with plan of discharge. [EF]   0827 Repeat lactate improved from prior.  Patient is comfortable plan of discharge.  He is discharged with prescription for Zofran and is educated on rehydration techniques. [EF]      ED Course User Index  [EF] Gale Mcnamara DO         Diagnoses as of 12/08/24 1237   Gastroenteritis         FINAL IMPRESSION      1. Gastroenteritis          DISPOSITION    Discharge 12/08/2024 08:27:22 AM     Gale Mcnamara DO  12/08/24 1237

## 2024-12-11 NOTE — ED PROVIDER NOTES
HPI   Chief Complaint   Patient presents with    Nausea     Patient started feeling episodes of N/V/D today at 0330. Patient has been feeling weak recently. Patient also states he's been having a hard time controlling BP with meds.        This is a 29-year-old male with past medical history of GERD and hypertension presenting the ED for evaluation of nausea vomiting and diarrhea.  He has severe stomach cramps prior to episodes of vomiting and diarrhea, symptoms started tonight and it been persistent.  He said more than 9 episodes of vomiting and diarrhea since his symptoms began.  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  He denies cough or congestion, fevers, chest pain or other complaints at this time.      History provided by:  Patient   used: No            Patient History   Past Medical History:   Diagnosis Date    GERD (gastroesophageal reflux disease)     Hypertension      Past Surgical History:   Procedure Laterality Date    CHOLECYSTECTOMY      HIATAL HERNIA REPAIR       No family history on file.  Social History     Tobacco Use    Smoking status: Former     Types: Cigarettes     Passive exposure: Past    Smokeless tobacco: Never   Substance Use Topics    Alcohol use: Yes    Drug use: Never       Physical Exam   ED Triage Vitals [12/08/24 0504]   Temperature Heart Rate Respirations BP   36.4 °C (97.5 °F) (!) 135 16 (!) 138/97      Pulse Ox Temp Source Heart Rate Source Patient Position   96 % Oral Monitor Lying      BP Location FiO2 (%)     Left arm --       Physical Exam  General: well developed, well nourished 29-year-old male who is awake and alert, ill-appearing, holding emesis bag but in no acute distress otherwise  Eyes: sclera clear bilaterally  HENT: normocephalic, atraumatic.   CV: Tachycardic, regular rhythm, no murmur, no gallops, or rubs.   Resp: clear to ascultation bilaterally, no wheezes, rales, or  rhonchi  GI: abdomen soft, diffuse discomfort to palpation without rigidity or guarding, no peritoneal signs, abdomen is nondistended, no masses palpated  Skin: warm, dry, without evidence of rash or abrasions    ED Course & MDM   ED Course as of 12/10/24 2200   Sun Dec 08, 2024   0734 Patient is feeling improved and able to tolerate Powerade.  Will recheck lactate, which if improved patient is comfortable with plan of discharge. [EF]   0827 Repeat lactate improved from prior.  Patient is comfortable plan of discharge.  He is discharged with prescription for Zofran and is educated on rehydration techniques. [EF]      ED Course User Index  [EF] Gale Mcnamara,          Diagnoses as of 12/10/24 2200   Gastroenteritis                 No data recorded                                 Medical Decision Making  Patient presents to the ED with nausea vomiting diarrhea, diffuse abdominal cramping starting a few hours ago today.  He is tachycardic on arrival, hypertensive, vitals otherwise normal.  He is ill-appearing but in no acute distress.  Labs ordered including lactate level and he was found to have leukocytosis and mildly elevated lactate of 2.1 likely secondary to his dehydration.  He received Zofran, Bentyl, IV fluids for initial management of his symptoms and dehydration.  A total of 2 L of IV fluids were ordered for him.  Patient was signed out to the oncoming physician pending remainder of his workup, reassessment, final disposition for the patient.      Procedure  Procedures     Varun Cortez,   12/10/24 2210

## 2024-12-17 ENCOUNTER — PHARMACY VISIT (OUTPATIENT)
Dept: PHARMACY | Facility: CLINIC | Age: 29
End: 2024-12-17
Payer: COMMERCIAL

## 2024-12-17 PROCEDURE — RXOTC WILLOW AMBULATORY OTC CHARGE

## 2025-02-21 DIAGNOSIS — K21.9 GASTROESOPHAGEAL REFLUX DISEASE WITHOUT ESOPHAGITIS: ICD-10-CM

## 2025-02-21 DIAGNOSIS — I10 PRIMARY HYPERTENSION: ICD-10-CM

## 2025-02-21 RX ORDER — OMEPRAZOLE 40 MG/1
CAPSULE, DELAYED RELEASE ORAL
Qty: 30 CAPSULE | Refills: 0 | Status: SHIPPED | OUTPATIENT
Start: 2025-02-21

## 2025-02-21 RX ORDER — AMLODIPINE BESYLATE 5 MG/1
5 TABLET ORAL DAILY
Qty: 30 TABLET | Refills: 0 | Status: SHIPPED | OUTPATIENT
Start: 2025-02-21

## 2025-02-27 ENCOUNTER — CONSULT (OUTPATIENT)
Dept: ENDOCRINOLOGY | Facility: CLINIC | Age: 30
End: 2025-02-27
Payer: COMMERCIAL

## 2025-02-27 VITALS
BODY MASS INDEX: 37.18 KG/M2 | SYSTOLIC BLOOD PRESSURE: 132 MMHG | HEART RATE: 86 BPM | TEMPERATURE: 97.6 F | RESPIRATION RATE: 26 BRPM | HEIGHT: 75 IN | DIASTOLIC BLOOD PRESSURE: 93 MMHG | OXYGEN SATURATION: 100 % | WEIGHT: 299 LBS

## 2025-02-27 DIAGNOSIS — Z31.441 GENETIC TEST OF MALE PARTNER OF HABITUAL ABORTER: ICD-10-CM

## 2025-02-27 DIAGNOSIS — Z13.71 SCREENING FOR GENETIC DISEASE CARRIER STATUS: ICD-10-CM

## 2025-02-27 DIAGNOSIS — Z31.41 FERTILITY TESTING: ICD-10-CM

## 2025-02-27 DIAGNOSIS — Z11.3 ENCOUNTER FOR SCREENING EXAMINATION FOR SEXUALLY TRANSMITTED DISEASE: Primary | ICD-10-CM

## 2025-02-27 PROCEDURE — 99213 OFFICE O/P EST LOW 20 MIN: CPT | Performed by: OBSTETRICS & GYNECOLOGY

## 2025-02-27 PROCEDURE — 99203 OFFICE O/P NEW LOW 30 MIN: CPT | Performed by: OBSTETRICS & GYNECOLOGY

## 2025-02-27 ASSESSMENT — COLUMBIA-SUICIDE SEVERITY RATING SCALE - C-SSRS
6. HAVE YOU EVER DONE ANYTHING, STARTED TO DO ANYTHING, OR PREPARED TO DO ANYTHING TO END YOUR LIFE?: NO
2. HAVE YOU ACTUALLY HAD ANY THOUGHTS OF KILLING YOURSELF?: NO
1. IN THE PAST MONTH, HAVE YOU WISHED YOU WERE DEAD OR WISHED YOU COULD GO TO SLEEP AND NOT WAKE UP?: NO

## 2025-02-27 ASSESSMENT — PATIENT HEALTH QUESTIONNAIRE - PHQ9
SUM OF ALL RESPONSES TO PHQ9 QUESTIONS 1 AND 2: 0
1. LITTLE INTEREST OR PLEASURE IN DOING THINGS: NOT AT ALL
2. FEELING DOWN, DEPRESSED OR HOPELESS: NOT AT ALL

## 2025-02-27 ASSESSMENT — PAIN SCALES - GENERAL: PAINLEVEL_OUTOF10: 0-NO PAIN

## 2025-02-27 NOTE — PATIENT INSTRUCTIONS
ASSESSMENT   29 y.o. male presents with partner for infertility evaluation  SA: Oligospermia      PLAN  Orders Placed This Encounter   Procedures    Hepatitis B surface antigen    Hepatitis C antibody    HIV 1/2 Antigen/Antibody Screen with Reflex to Confirmation    Syphilis Screen with Reflex    C. Trachomatis / N. Gonorrhoeae, Amplified Detection    My[facility] Foresight Carrier Screen    POCT Semen Analysis Complete with Strict Morphology    Chromosome Analysis, Blood       PARTNER  Yes Semen Analysis: Ordered  Yes Genetic screening: Ordered  STDs as above    FOLLOW UP   Follow up with partner for follow up visit as directed to review result and further management.     Adriane Rowe  02/27/2025  11:24 AM

## 2025-02-27 NOTE — PROGRESS NOTES
"  NEW FERTILITY PATIENT VISIT- Male Partner    Partner information:  Polly Loaiza   7/15/1996     Aneesh Loaiza \"TJ\" is a 29 y.o. male who presents with female partner for infertility evaluation       Brief history:   PARTNER HISTORY  Partner Name: Aneesh Loaiza  Partner : [id number]  Partner email: [email or web address]  Occupation: Emergency medical technician-Paramedic  Prior fertility history: Low sperm count and motility  PMH: Hypertension, gerd, obesity  PSH: Hiatal hernia repair, gallbladder removal  Smoking:No  Alcohol Use: No  Drug Use: No  Medications: Amlodapine, omeprozol, anastrazole  Injuries: No  STD: No  Please select all that are applicable:    SA: Yes  SA Results: No    -Has seen urology (Dr. Monty Siu) who started anastrozole and sildenafil.  Patient has not taken the anastrozole due to cost    Volume (Semen)  >=1.5 mL 2.50   Concentration(Semen)  >=15 mill/mL 6.05 Abnormal    Total Motility (Semen)  >=40 % 64   Prog. Motility (Semen)  >=32 % 53   Non Prog. Motility (Semen)  % 11   Total No of Sperm (Semen)  >=39 mill 15.13 Abnormal    Total No of Motile (Semen)  mill 9.67   Total No of Rnd Cells (Semen)  <=5 mill 1.2   Leukocyte (Semen) Negative   % Normal (Semen)  >=4 % 2.0 Abnormal    % Head defects (Semen)  % 97.5   % Neck Midpiece (Semen)  % 20.8   % Tail defects (Semen)  % 2.3   % Ex Residual Cytoplasm (Semen)  % 0.8   Tot. No of Norm. Sperm (Semen)  mill 0.303   Tot. No of Norm. Motile Sperm (Semen)  mill 0.193     -Has tried anastrozole- stopped due to cost    Prior Labs  Lab Results    Date Done      Hepatitis B surface antigen: No results found for requested labs within last 365 days. No results found for requested labs within last 365 days.   Hepatitis C antibody: No results found for requested labs within last 365 days. No results found for requested labs within last 365 days.   HIV ½ Antigen Antibody screen with reflex: No results found for requested labs within " last 365 days. No results found for requested labs within last 365 days.   Syphilis screening with reflex: No results found for requested labs within last 365 days. No results found for requested labs within last 365 days.   GC: No results found for requested labs within last 365 days. No results found for requested labs within last 365 days.   CT: No results found for requested labs within last 365 days. No results found for requested labs within last 365 days.      PMH  Past Medical History:   Diagnosis Date    GERD (gastroesophageal reflux disease)     Hypertension         MEDICATIONS  Current Outpatient Medications on File Prior to Visit   Medication Sig Dispense Refill    amLODIPine (Norvasc) 5 mg tablet Take 1 tablet by mouth once daily 30 tablet 0    omeprazole (PriLOSEC) 40 mg DR capsule TAKE 1 CAPSULE BY MOUTH ONCE DAILY IN THE MORNING -TAKE  BEFORE  MEALS,  DO  NOT  CRUSH  OR  CHEW 30 capsule 0    anastrozole (Arimidex) 1 mg tablet Take 1 tablet (1 mg total) by mouth once daily.  Swallow whole with a drink of water. (Patient not taking: Reported on 2/27/2025) 60 tablet 3    ascorbic acid (Vitamin C) 500 mg tablet Take 1 tablet (500 mg) by mouth once daily. (Patient not taking: Reported on 2/27/2025) 30 tablet 1    sildenafil (Viagra) 25 mg tablet Take 1 tablet (25 mg) by mouth once daily as needed for erectile dysfunction. 10 tablet 1    [DISCONTINUED] amLODIPine (Norvasc) 5 mg tablet Take 1 tablet by mouth once daily 30 tablet 0    [DISCONTINUED] omeprazole (PriLOSEC) 40 mg DR capsule TAKE 1 CAPSULE BY MOUTH ONCE DAILY IN THE MORNING -TAKE  BEFORE  MEALS,  DO  NOT  CRUSH  OR  CHEW 30 capsule 0     No current facility-administered medications on file prior to visit.       PSH  Past Surgical History:   Procedure Laterality Date    CHOLECYSTECTOMY      HIATAL HERNIA REPAIR            SOCIAL HISTORY  Social History     Tobacco Use    Smoking status: Former     Types: Cigarettes     Passive exposure: Past     "Smokeless tobacco: Never   Substance Use Topics    Alcohol use: Yes    Drug use: Never         FAMILY HISTORY   No family history on file.     BMI:   BMI Readings from Last 1 Encounters:   02/27/25 37.37 kg/m²     VITALS:  BP (!) 132/93   Pulse 86   Temp 36.4 °C (97.6 °F)   Resp 26   Ht 1.905 m (6' 3\")   Wt 136 kg (299 lb)   SpO2 100%   BMI 37.37 kg/m²       ASSESSMENT   29 y.o. male presents with partner for infertility evaluation  SA: Oligospermia      PLAN  Orders Placed This Encounter   Procedures    Hepatitis B surface antigen    Hepatitis C antibody    HIV 1/2 Antigen/Antibody Screen with Reflex to Confirmation    Syphilis Screen with Reflex    C. Trachomatis / N. Gonorrhoeae, Amplified Detection    [facility] Foresight Carrier Screen    POCT Semen Analysis Complete with Strict Morphology    Chromosome Analysis, Blood       PARTNER  Yes Semen Analysis: Ordered  Yes Genetic screening: Ordered  STDs as above    FOLLOW UP   Follow up with partner for follow up visit as directed to review result and further management.     Adriane Rowe  02/27/2025  11:24 AM    "

## 2025-02-28 LAB
ESTRADIOL SERPL-MCNC: 29 PG/ML
FSH SERPL-ACNC: 7.2 MIU/ML (ref 1.4–12.8)
LH SERPL-ACNC: 8.5 MIU/ML (ref 1.5–9.3)
PROLACTIN SERPL-MCNC: 6.7 NG/ML (ref 2–18)
PSA SERPL-MCNC: 0.29 NG/ML

## 2025-03-02 LAB
C TRACH RRNA SPEC QL NAA+PROBE: NOT DETECTED
HBV SURFACE AG SERPL QL IA: NORMAL
HCV AB SERPL QL IA: NORMAL
HIV 1+2 AB+HIV1 P24 AG SERPL QL IA: NORMAL
N GONORRHOEA RRNA SPEC QL NAA+PROBE: NOT DETECTED
QUEST GC CT AMPLIFIED (ALWAYS MESSAGE): NORMAL
T PALLIDUM AB SER QL IA: NEGATIVE

## 2025-03-11 ENCOUNTER — ANCILLARY PROCEDURE (OUTPATIENT)
Dept: ENDOCRINOLOGY | Facility: CLINIC | Age: 30
End: 2025-03-11
Payer: COMMERCIAL

## 2025-03-11 DIAGNOSIS — Z31.41 FERTILITY TESTING: ICD-10-CM

## 2025-03-11 LAB
% EX RESIDUAL CYTOPLASM (SEMEN): 0 %
% HEAD DEFECTS (SEMEN): 96.8 %
% NECK MIDPIECE (SEMEN): 13 %
% NORMAL (SEMEN): 3.3 % (ref 4–?)
% TAIL DEFECTS (SEMEN): 6.3 %
ABSTINENCE (DAYS): 4 DAYS (ref 2–7)
AGGLUTINATION (SEMEN): NO
AMOUNT MISSED:: ABNORMAL
ANALYZED TIME:: ABNORMAL
ANDROLOGY LAB ID#: ABNORMAL
CLUMPS (SEMEN): NO
COLLECTED COMPLETELY: YES
COLLECTION LOCATION:: ABNORMAL
COLLECTION METHOD:: ABNORMAL
CONCENTRATION (URINE): ABNORMAL
CONCENTRATION CN (POST-WASH): ABNORMAL
CONCENTRATION(SEMEN): 17.17 MILL/ML (ref 15–?)
DEBRIS (SEMEN): YES
DEGREE (SEMEN): ABNORMAL
LEUKOCYTE (SEMEN): NEGATIVE
NON PROG. MOTILITY (SEMEN): 9 %
NON PROG. MOTILITY (URINE): ABNORMAL
NON PROG. MOTILITY CN (POST-WASH): ABNORMAL
PATTERN (SEMEN): ABNORMAL
PORTION MISSED REI: ABNORMAL
PROG. MOTILITY (SEMEN): 55 % (ref 32–?)
PROG. MOTILITY (URINE): ABNORMAL
PROG. MOTILITY CN (POST-WASH): ABNORMAL
RECEIVED TIME:: ABNORMAL
REI PARTNER DOB: ABNORMAL
REI PARTNER NAME: ABNORMAL
SEMEN APPEARANCE: NORMAL
SEMEN LIQUEFACTION: NORMAL
SEMEN VISCOSITY: NORMAL
TOT. NO OF NORM. MOTILE SPERM (SEMEN): 0.68 MILL
TOT. NO OF NORM. SPERM (SEMEN): 1.06 MILL
TOTAL MOTILITY (SEMEN): 64 % (ref 40–?)
TOTAL MOTILITY (URINE): ABNORMAL
TOTAL MOTILITY CN (POST-WASH): ABNORMAL
TOTAL NO OF MOTILE (SEMEN): 20.96 MILL
TOTAL NO OF MOTILE (URINE): ABNORMAL
TOTAL NO OF MOTILE CN (POST-WASH): ABNORMAL
TOTAL NO OF RND CELLS (SEMEN): 3.6 MILL (ref ?–5)
TOTAL NO OF RND CELLS (URINE): ABNORMAL
TOTAL NO OF SPERM (SEMEN): 32.62 MILL (ref 39–?)
TOTAL NO OF SPERM (URINE): ABNORMAL
TOTAL NO OF SPERM CN (POST-WASH): ABNORMAL
VOLUME (SEMEN): 1.9 ML (ref 1.5–?)
VOLUME CN (POST-WASH): ABNORMAL
VOLUME OF URINE: ABNORMAL

## 2025-03-11 PROCEDURE — 89322 SEMEN ANAL STRICT CRITERIA: CPT | Performed by: STUDENT IN AN ORGANIZED HEALTH CARE EDUCATION/TRAINING PROGRAM

## 2025-03-17 ENCOUNTER — APPOINTMENT (OUTPATIENT)
Dept: PRIMARY CARE | Facility: CLINIC | Age: 30
End: 2025-03-17
Payer: COMMERCIAL

## 2025-03-17 VITALS
DIASTOLIC BLOOD PRESSURE: 83 MMHG | BODY MASS INDEX: 36.93 KG/M2 | WEIGHT: 297 LBS | HEIGHT: 75 IN | SYSTOLIC BLOOD PRESSURE: 131 MMHG | HEART RATE: 77 BPM

## 2025-03-17 DIAGNOSIS — R63.5 ABNORMAL WEIGHT GAIN: Primary | ICD-10-CM

## 2025-03-17 PROCEDURE — 99214 OFFICE O/P EST MOD 30 MIN: CPT | Performed by: FAMILY MEDICINE

## 2025-03-17 PROCEDURE — 1036F TOBACCO NON-USER: CPT | Performed by: FAMILY MEDICINE

## 2025-03-17 PROCEDURE — 3008F BODY MASS INDEX DOCD: CPT | Performed by: FAMILY MEDICINE

## 2025-03-17 ASSESSMENT — PATIENT HEALTH QUESTIONNAIRE - PHQ9
2. FEELING DOWN, DEPRESSED OR HOPELESS: MORE THAN HALF THE DAYS
7. TROUBLE CONCENTRATING ON THINGS, SUCH AS READING THE NEWSPAPER OR WATCHING TELEVISION: SEVERAL DAYS
SUM OF ALL RESPONSES TO PHQ QUESTIONS 1-9: 11
4. FEELING TIRED OR HAVING LITTLE ENERGY: MORE THAN HALF THE DAYS
3. TROUBLE FALLING OR STAYING ASLEEP OR SLEEPING TOO MUCH: SEVERAL DAYS
8. MOVING OR SPEAKING SO SLOWLY THAT OTHER PEOPLE COULD HAVE NOTICED. OR THE OPPOSITE, BEING SO FIGETY OR RESTLESS THAT YOU HAVE BEEN MOVING AROUND A LOT MORE THAN USUAL: NOT AT ALL
5. POOR APPETITE OR OVEREATING: MORE THAN HALF THE DAYS
SUM OF ALL RESPONSES TO PHQ9 QUESTIONS 1 AND 2: 3
6. FEELING BAD ABOUT YOURSELF - OR THAT YOU ARE A FAILURE OR HAVE LET YOURSELF OR YOUR FAMILY DOWN: MORE THAN HALF THE DAYS
1. LITTLE INTEREST OR PLEASURE IN DOING THINGS: SEVERAL DAYS
9. THOUGHTS THAT YOU WOULD BE BETTER OFF DEAD, OR OF HURTING YOURSELF: NOT AT ALL
10. IF YOU CHECKED OFF ANY PROBLEMS, HOW DIFFICULT HAVE THESE PROBLEMS MADE IT FOR YOU TO DO YOUR WORK, TAKE CARE OF THINGS AT HOME, OR GET ALONG WITH OTHER PEOPLE: SOMEWHAT DIFFICULT

## 2025-03-17 NOTE — PROGRESS NOTES
"Subjective   Patient ID: Aneesh Loaiza \"TJ\" is a 29 y.o. male who presents for Sick Visit (Depression (PHQ2/9), has never taken anything for depression before; obesity; wife would like him to check cortisol levels ).    Low libido, ED: Going through fertility treatment. Thinking they will have to go through IVF. They have tried ashwaghanda, letrozole, anastrozole. Following with Dr. Murdock.     Depression: Feels \"tired,\" down and depressed. This has been going on for years but is getting worse. He has a history of feeling suicidal at age 17 or 18, but never attempted. He has a history of cutting. No sense of generalized anxiety. No panic attacks. He states that sleep is \"sporadic.\" Sometimes he falls asleep within 10 minutes, sometimes can't fall asleep for 3 hours. Typically gets around 4 hours of sleep. 3 hours on a bad night, 6 or 7 occasionally. He is using CPAP as much as he can with his work schedule. He is working 24 hours shifts at [employer] and that is every 3 days. He is doing shifts at  2 days every week.     Obesity: He has been putting on weight despite increased activity level and eating the same. He is questioning about his cortisol level.      HTN: previously on hydrochlorothiazide and amlodipine which he didn't like much. He checks his BP at home and his systolic has been around 145/90 on average.      GERD, h/o hiatal hernia repair (2017): On omeprazole otc which works well but he is requesting rx. He is getting breakthrough so he is considering increasing to BID. His hernia was undiagnosed for many years and so he developed esophageal ulcers.      Frequent waking, snoring: Sx getting worse over time. Strong Fhx of TYSHAWN and he wakes up a lot so he is hoping to get a sleep study to look into this.      Review of systems completed and unremarkable other than what is documented in HPI.    Objective   BP (!) 152/107 (BP Location: Left arm, Patient Position: Sitting, BP Cuff Size: Large adult)   " "Pulse 77   Ht 1.905 m (6' 3\")   Wt 135 kg (297 lb)   BMI 37.12 kg/m²     Gen: No acute distress, alert and oriented x3, pleasant   HEENT: moist mucous membranes, b/l external auditory canals are clear of debris, TMs within normal limits, no oropharyngeal lesions, eomi, perrla   Neck: thyroid within normal limits, no lymphadenopathy   CV: RRR, normal S1/S2, no murmur   Resp: Clear to auscultation bilaterally, no wheezes or rhonchi appreciated  Abd: soft, nontender, non-distended, no guarding/rigidity, bowel sounds present  Extr: no edema, no calf tenderness  Derm: Skin is warm and dry, no rashes appreciated  Psych: mood is good, affect is congruent, good hygiene, normal speech and eye contact  Neuro: cranial nerves grossly intact, normal gait    Patient Health Questionnaire-2 Score: 3 (3/17/2025 11:43 AM)   Patient Health Questionnaire-9 Score: 11      Assessment/Plan   #Depression  Check labs  Trial wellbutrin    #Obesioty  Trial wellbutrin    #Low libido  #ED  Check labs  Trial sildenafil     #HTN:   Controlled on low dose amlodipine     #GERD:   #Hiatal hernia s/p repair  Not controlled on otc omeprazole  Increasing the dosage     #Frequent waking  #Snoring:   Ordering sleep study  Would want rx through [facility]     HCM:  UTD for flu    In the last 3 months, have you made lifestyle changes to lose weight?    Started exercising  How many hours per week of exercise?   1-3, depends on the day  How many meals and snacks are you eating per day?   1-2 meals, snacks a lot because of his job and being on the go  What does an average meal and snack look like for you?   Eats a lot of fast food   How many times per week do you eat fast food/take out?    3-4  Have you lost weight in the last 3 months?   no  Have you tried any medications for weight loss before?    no    "

## 2025-03-18 DIAGNOSIS — F32.A DEPRESSION, UNSPECIFIED DEPRESSION TYPE: Primary | ICD-10-CM

## 2025-03-18 LAB
25(OH)D3+25(OH)D2 SERPL-MCNC: 7 NG/ML (ref 30–100)
ALBUMIN SERPL-MCNC: 4.4 G/DL (ref 3.6–5.1)
ALP SERPL-CCNC: 101 U/L (ref 36–130)
ALT SERPL-CCNC: 19 U/L (ref 9–46)
ANION GAP SERPL CALCULATED.4IONS-SCNC: 12 MMOL/L (CALC) (ref 7–17)
AST SERPL-CCNC: 30 U/L (ref 10–40)
BASOPHILS # BLD AUTO: 64 CELLS/UL (ref 0–200)
BASOPHILS NFR BLD AUTO: 0.8 %
BILIRUB SERPL-MCNC: 0.4 MG/DL (ref 0.2–1.2)
BUN SERPL-MCNC: 10 MG/DL (ref 7–25)
CALCIUM SERPL-MCNC: 9.2 MG/DL (ref 8.6–10.3)
CHLORIDE SERPL-SCNC: 102 MMOL/L (ref 98–110)
CO2 SERPL-SCNC: 27 MMOL/L (ref 20–32)
CREAT SERPL-MCNC: 0.81 MG/DL (ref 0.6–1.24)
EGFRCR SERPLBLD CKD-EPI 2021: 124 ML/MIN/1.73M2
EOSINOPHIL # BLD AUTO: 120 CELLS/UL (ref 15–500)
EOSINOPHIL NFR BLD AUTO: 1.5 %
ERYTHROCYTE [DISTWIDTH] IN BLOOD BY AUTOMATED COUNT: 14.1 % (ref 11–15)
EST. AVERAGE GLUCOSE BLD GHB EST-MCNC: 131 MG/DL
EST. AVERAGE GLUCOSE BLD GHB EST-SCNC: 7.3 MMOL/L
GLUCOSE SERPL-MCNC: 109 MG/DL (ref 65–99)
HBA1C MFR BLD: 6.2 % OF TOTAL HGB
HCT VFR BLD AUTO: 43.3 % (ref 38.5–50)
HGB BLD-MCNC: 13.7 G/DL (ref 13.2–17.1)
LYMPHOCYTES # BLD AUTO: 2272 CELLS/UL (ref 850–3900)
LYMPHOCYTES NFR BLD AUTO: 28.4 %
MCH RBC QN AUTO: 25.2 PG (ref 27–33)
MCHC RBC AUTO-ENTMCNC: 31.6 G/DL (ref 32–36)
MCV RBC AUTO: 79.6 FL (ref 80–100)
MONOCYTES # BLD AUTO: 544 CELLS/UL (ref 200–950)
MONOCYTES NFR BLD AUTO: 6.8 %
NEUTROPHILS # BLD AUTO: 5000 CELLS/UL (ref 1500–7800)
NEUTROPHILS NFR BLD AUTO: 62.5 %
PLATELET # BLD AUTO: 232 THOUSAND/UL (ref 140–400)
PMV BLD REES-ECKER: 11.2 FL (ref 7.5–12.5)
POTASSIUM SERPL-SCNC: 3.8 MMOL/L (ref 3.5–5.3)
PROT SERPL-MCNC: 7.4 G/DL (ref 6.1–8.1)
RBC # BLD AUTO: 5.44 MILLION/UL (ref 4.2–5.8)
SODIUM SERPL-SCNC: 141 MMOL/L (ref 135–146)
TSH SERPL-ACNC: 1.42 MIU/L (ref 0.4–4.5)
VIT B12 SERPL-MCNC: 468 PG/ML (ref 200–1100)
WBC # BLD AUTO: 8 THOUSAND/UL (ref 3.8–10.8)

## 2025-03-18 RX ORDER — BUPROPION HYDROCHLORIDE 150 MG/1
150 TABLET ORAL EVERY MORNING
Qty: 30 TABLET | Refills: 1 | Status: SHIPPED | OUTPATIENT
Start: 2025-03-18 | End: 2025-05-17

## 2025-04-12 DIAGNOSIS — I10 PRIMARY HYPERTENSION: ICD-10-CM

## 2025-04-12 DIAGNOSIS — K21.9 GASTROESOPHAGEAL REFLUX DISEASE WITHOUT ESOPHAGITIS: ICD-10-CM

## 2025-04-14 RX ORDER — OMEPRAZOLE 40 MG/1
CAPSULE, DELAYED RELEASE ORAL
Qty: 30 CAPSULE | Refills: 0 | Status: SHIPPED | OUTPATIENT
Start: 2025-04-14

## 2025-04-14 RX ORDER — AMLODIPINE BESYLATE 5 MG/1
5 TABLET ORAL DAILY
Qty: 30 TABLET | Refills: 0 | Status: SHIPPED | OUTPATIENT
Start: 2025-04-14

## 2025-04-22 NOTE — PROGRESS NOTES
"Subjective   Patient ID: Aneesh Loaiza \"TJ\" is a 30 y.o. male who presents for Audio Telehealth Appointment.    An audio telecommunication system, which permits real time communications between the patient and provider, was utilized to provide this telehealth service.   Verbal consent was requested and obtained from     Aneesh Loaiza on this date, 4/28/2025, for a telehealth visit and the patient's location was confirmed at the time of the visit.     Low libido, ED: Going through fertility treatment. Thinking they will have to go through IVF. They have tried ashwaghanda, letrozole, anastrozole. Following with Dr. Murdock.      Depression: Feels \"tired,\" down and depressed. This has been going on for years but is getting worse. He has a history of feeling suicidal at age 17 or 18, but never attempted. He has a history of cutting. No sense of generalized anxiety. No panic attacks. He states that sleep is \"sporadic.\" Sometimes he falls asleep within 10 minutes, sometimes can't fall asleep for 3 hours. Typically gets around 4 hours of sleep. 3 hours on a bad night, 6 or 7 occasionally. He is using CPAP as much as he can with his work schedule. He is working 24 hours shifts at [employer] and that is every 3 days. He is doing shifts at  2 days every week.     He is taking the wellbutrin for 6 weeks. Feels it is helping. Improved motivation and drive. Occasionally still feeling low motivation. Increased interest level in sex. Energy level has improved a bit. Sleep quality seems to have improved. No SE's.       Obesity: He has been putting on weight despite increased activity level and eating the same. He is questioning about his cortisol level.      HTN: previously on hydrochlorothiazide and amlodipine which he didn't like much. He checks his BP at home and his systolic has been around 145/90 on average.      GERD, h/o hiatal hernia repair (2017): On omeprazole otc which works well but he is requesting rx. He is " getting breakthrough so he is considering increasing to BID. His hernia was undiagnosed for many years and so he developed esophageal ulcers.      Frequent waking, snoring: Sx getting worse over time. Strong Fhx of TYSHAWN and he wakes up a lot so he is hoping to get a sleep study to look into this.      Review of systems completed and unremarkable other than what is documented in HPI.    Assessment/Plan     #Depression  Doing well on wellbutrin    #Vitamin D deficiency  Start booster dose    #Prediabetes  Trial metformin     #Obesity  Trial wellbutrin     #Low libido  #ED  Check labs  Trial sildenafil     #HTN:   Controlled on low dose amlodipine     #GERD:   #Hiatal hernia s/p repair  Not controlled on otc omeprazole  Increasing the dosage     #Frequent waking  #Snoring:   Ordering sleep study  Would want rx through [facility]     HCM:  UTD for flu    I spent 8 minutes in the professional and overall care of this patient.    I have communicated my name and active licensure. Telephone visit completed with realtime audio connection. Informed consent was obtained from the patient. Patient was made aware that my evaluation and diagnosis are limited due to the fact that we are not in the same room during the interview and that this is a virtual encounter that took place via telephone call. Patient verbalized understanding.     Electronically signed by Polly Diego DO  1:46 PM

## 2025-04-28 ENCOUNTER — APPOINTMENT (OUTPATIENT)
Dept: PRIMARY CARE | Facility: CLINIC | Age: 30
End: 2025-04-28
Payer: COMMERCIAL

## 2025-04-28 DIAGNOSIS — E56.9 VITAMIN DEFICIENCY: Primary | ICD-10-CM

## 2025-04-28 DIAGNOSIS — R73.03 PREDIABETES: ICD-10-CM

## 2025-04-28 DIAGNOSIS — I10 PRIMARY HYPERTENSION: ICD-10-CM

## 2025-04-28 DIAGNOSIS — K21.9 GASTROESOPHAGEAL REFLUX DISEASE WITHOUT ESOPHAGITIS: ICD-10-CM

## 2025-04-28 RX ORDER — OMEPRAZOLE 40 MG/1
40 CAPSULE, DELAYED RELEASE ORAL
Qty: 90 CAPSULE | Refills: 3 | Status: SHIPPED | OUTPATIENT
Start: 2025-04-28 | End: 2026-04-28

## 2025-04-28 RX ORDER — ERGOCALCIFEROL 1.25 MG/1
1.25 CAPSULE ORAL WEEKLY
Qty: 12 CAPSULE | Refills: 3 | Status: SHIPPED | OUTPATIENT
Start: 2025-04-28 | End: 2026-03-30

## 2025-04-28 RX ORDER — METFORMIN HYDROCHLORIDE 500 MG/1
500 TABLET ORAL
Qty: 30 TABLET | Refills: 2 | Status: SHIPPED | OUTPATIENT
Start: 2025-04-28 | End: 2025-07-27

## 2025-04-28 RX ORDER — AMLODIPINE BESYLATE 5 MG/1
5 TABLET ORAL DAILY
Qty: 90 TABLET | Refills: 3 | Status: SHIPPED | OUTPATIENT
Start: 2025-04-28 | End: 2026-04-28

## 2025-05-10 DIAGNOSIS — F32.A DEPRESSION, UNSPECIFIED DEPRESSION TYPE: ICD-10-CM

## 2025-05-13 RX ORDER — BUPROPION HYDROCHLORIDE 150 MG/1
TABLET ORAL
Qty: 30 TABLET | Refills: 0 | Status: SHIPPED | OUTPATIENT
Start: 2025-05-13

## 2025-05-28 ENCOUNTER — APPOINTMENT (OUTPATIENT)
Dept: PRIMARY CARE | Facility: CLINIC | Age: 30
End: 2025-05-28
Payer: COMMERCIAL

## 2025-06-09 DIAGNOSIS — F32.A DEPRESSION, UNSPECIFIED DEPRESSION TYPE: ICD-10-CM

## 2025-06-09 RX ORDER — BUPROPION HYDROCHLORIDE 150 MG/1
TABLET ORAL
Qty: 30 TABLET | Refills: 0 | Status: SHIPPED | OUTPATIENT
Start: 2025-06-09

## 2025-06-30 NOTE — PROGRESS NOTES
"Subjective   Patient ID: Aneesh Loaiza \"BASIA\" is a 30 y.o. male who presents for Follow-up (Blood pressure- typically over 140 sys with amlodipine, heart rate also increases with BP), Cough (Constant, dry cough for the last at least 3 months. Has been taking mucinex and zyrtec), and Nail Problem (Would like referral for toe nail fungus and calluses/flaking).    Dry cough: Started 3 mo ago. Occasionally productive. Started back in April. He has tried mucinex to treat for sinus and allergies. He tried delsym. He tried cetirizine. No sore throat. Feels like there is a flap that is making a catching noise when he exhales. No sinus pressure. No PND. No vomiting. Some chronic diarrhea from metformin.     Toenail fungus: has never tried oral meds.     Low libido, ED: Going through fertility treatment. Thinking they will have to go through IVF. They have tried ashwaghanda, letrozole, anastrozole. Following with Dr. Murdock.      Depression: He is taking the wellbutrin for 6 weeks. Feels it is helping. Improved motivation and drive. Occasionally still feeling low motivation. Increased interest level in sex. Energy level has improved a bit. Sleep quality seems to have improved. No SE's.       Obesity: He has been putting on weight despite increased activity level and eating the same. He is questioning about his cortisol level.      HTN: On amlodipine alone. He is checking BP at home and he has been 140's and 150's systolic pretty consistently. Occasionally heart rate will not drop below 100 all day. He tried taking 10mg of the amlodipine without any change.      GERD, h/o hiatal hernia repair (2017): On omeprazole otc which works well but he is requesting rx. He is getting breakthrough so he is considering increasing to BID. His hernia was undiagnosed for many years and so he developed esophageal ulcers.      Frequent waking, snoring: Sx getting worse over time. Strong Fhx of TYSHAWN and he wakes up a lot so he is hoping to get " "a sleep study to look into this.      Review of systems completed and unremarkable other than what is documented in HPI.    Objective   BP (!) 131/91 (BP Location: Left arm, Patient Position: Sitting, BP Cuff Size: Large adult)   Pulse 72   Ht 1.905 m (6' 3\")   Wt 131 kg (289 lb)   BMI 36.12 kg/m²     Gen: No acute distress, alert and oriented x3, pleasant   HEENT: moist mucous membranes, b/l external auditory canals are clear of debris, TMs within normal limits, no oropharyngeal lesions, eomi, perrla   Neck: thyroid within normal limits, no lymphadenopathy   CV: RRR, normal S1/S2, no murmur   Resp: Clear to auscultation bilaterally, no wheezes or rhonchi appreciated  Abd: soft, nontender, non-distended, no guarding/rigidity, bowel sounds present  Extr: no edema, no calf tenderness  Derm: Skin is warm and dry, no rashes appreciated  Psych: mood is good, affect is congruent, good hygiene, normal speech and eye contact  Neuro: cranial nerves grossly intact, normal gait      Assessment/Plan   #Depression  Doing well on wellbutrin  Considering increasing the dose    #Cough  Trial azithromycin + prednisone    #Onychomycosis  Rx sent terbinafine     #Vitamin D deficiency  Start booster dose     #Prediabetes  #Obesity  Doing well on metformin + wellbutrin     #Low libido  #ED  Check labs  Trial sildenafil     #HTN:   Controlled on low dose amlodipine  Try adding low dose metoprolol     #GERD:   #Hiatal hernia s/p repair  Not controlled on otc omeprazole  Increasing the dosage     #Frequent waking  #Snoring:   Ordering sleep study  Would want rx through [facility]     HCM:  UTD for flu  Overdue for tdap  "

## 2025-07-01 ENCOUNTER — APPOINTMENT (OUTPATIENT)
Dept: PRIMARY CARE | Facility: CLINIC | Age: 30
End: 2025-07-01
Payer: COMMERCIAL

## 2025-07-01 VITALS
BODY MASS INDEX: 35.93 KG/M2 | SYSTOLIC BLOOD PRESSURE: 131 MMHG | HEART RATE: 72 BPM | DIASTOLIC BLOOD PRESSURE: 91 MMHG | HEIGHT: 75 IN | WEIGHT: 289 LBS

## 2025-07-01 DIAGNOSIS — J20.9 ACUTE BRONCHITIS, UNSPECIFIED ORGANISM: ICD-10-CM

## 2025-07-01 DIAGNOSIS — I10 PRIMARY HYPERTENSION: Primary | ICD-10-CM

## 2025-07-01 DIAGNOSIS — B35.1 ONYCHOMYCOSIS: ICD-10-CM

## 2025-07-01 DIAGNOSIS — R21 RASH: ICD-10-CM

## 2025-07-01 PROCEDURE — 3008F BODY MASS INDEX DOCD: CPT | Performed by: FAMILY MEDICINE

## 2025-07-01 PROCEDURE — 1036F TOBACCO NON-USER: CPT | Performed by: FAMILY MEDICINE

## 2025-07-01 PROCEDURE — 3080F DIAST BP >= 90 MM HG: CPT | Performed by: FAMILY MEDICINE

## 2025-07-01 PROCEDURE — 3075F SYST BP GE 130 - 139MM HG: CPT | Performed by: FAMILY MEDICINE

## 2025-07-01 PROCEDURE — 99214 OFFICE O/P EST MOD 30 MIN: CPT | Performed by: FAMILY MEDICINE

## 2025-07-01 RX ORDER — CLOTRIMAZOLE 1 %
CREAM (GRAM) TOPICAL 2 TIMES DAILY
Qty: 30 G | Refills: 0 | Status: SHIPPED | OUTPATIENT
Start: 2025-07-01 | End: 2025-10-29

## 2025-07-01 RX ORDER — PREDNISONE 20 MG/1
40 TABLET ORAL DAILY
Qty: 10 TABLET | Refills: 0 | Status: SHIPPED | OUTPATIENT
Start: 2025-07-01 | End: 2025-07-06

## 2025-07-01 RX ORDER — AZITHROMYCIN 250 MG/1
TABLET, FILM COATED ORAL
Qty: 6 TABLET | Refills: 0 | Status: SHIPPED | OUTPATIENT
Start: 2025-07-01 | End: 2025-07-06

## 2025-07-01 RX ORDER — METOPROLOL SUCCINATE 25 MG/1
25 TABLET, EXTENDED RELEASE ORAL DAILY
Qty: 30 TABLET | Refills: 5 | Status: SHIPPED | OUTPATIENT
Start: 2025-07-01 | End: 2025-12-28

## 2025-07-01 RX ORDER — TERBINAFINE HYDROCHLORIDE 250 MG/1
250 TABLET ORAL DAILY
Qty: 90 TABLET | Refills: 0 | Status: SHIPPED | OUTPATIENT
Start: 2025-07-01 | End: 2025-09-29

## 2025-07-13 DIAGNOSIS — F32.A DEPRESSION, UNSPECIFIED DEPRESSION TYPE: ICD-10-CM

## 2025-07-14 RX ORDER — BUPROPION HYDROCHLORIDE 150 MG/1
TABLET ORAL
Qty: 30 TABLET | Refills: 0 | Status: SHIPPED | OUTPATIENT
Start: 2025-07-14

## 2025-07-29 ENCOUNTER — APPOINTMENT (OUTPATIENT)
Dept: PRIMARY CARE | Facility: CLINIC | Age: 30
End: 2025-07-29
Payer: COMMERCIAL

## 2025-07-29 DIAGNOSIS — R53.83 OTHER FATIGUE: Primary | ICD-10-CM

## 2025-07-29 DIAGNOSIS — R73.03 PREDIABETES: ICD-10-CM

## 2025-07-29 PROCEDURE — 98012 SYNCH AUDIO-ONLY EST SF 10: CPT | Performed by: FAMILY MEDICINE

## 2025-07-29 RX ORDER — BUPROPION HYDROCHLORIDE 300 MG/1
300 TABLET ORAL EVERY MORNING
Qty: 30 TABLET | Refills: 5 | Status: SHIPPED | OUTPATIENT
Start: 2025-07-29 | End: 2026-01-25

## 2025-07-29 RX ORDER — METFORMIN HYDROCHLORIDE 500 MG/1
500 TABLET ORAL
Qty: 30 TABLET | Refills: 0 | Status: SHIPPED | OUTPATIENT
Start: 2025-07-29

## 2025-07-29 NOTE — PROGRESS NOTES
"Subjective   Patient ID: Aneesh Loaiza \"TJ\" is a 30 y.o. male who presents for Audio Telehealth Appointment.    An audio telecommunication system, which permits real time communications between the patient and provider, was utilized to provide this telehealth service.   Verbal consent was requested and obtained from Aneesh Loaiza on this date, 7/29/2025, for a telehealth visit and the patient's location was confirmed at the time of the visit.     While technically available, the patient was unable or unwilling to consent to connect via audio/video telehealth technology; therefore, I performed this visit using a real-time audio only connection   _____________________________________________    Dry cough: resolved.      Toenail fungus: On terbinafine.      Low libido, ED: Going through fertility treatment. Thinking they will have to go through IVF. They have tried ashwaghanda, letrozole, anastrozole. Following with Dr. Murdock.      Depression: He is on wellbutrin. Noticing that he is getting irritated easily taking the wellbutrin for 6 weeks. Wants to up the dose to see if it helps with his motivation and drive.      Obesity: He has been putting on weight despite increased activity level and eating the same. He is questioning about his cortisol level.      HTN: On amlodipine and metoprolol. Work BP's have improved.      GERD, h/o hiatal hernia repair (2017): On omeprazole otc which works well but he is requesting rx. He is getting breakthrough so he is considering increasing to BID. His hernia was undiagnosed for many years and so he developed esophageal ulcers.      Frequent waking, snoring: Sx getting worse over time. Strong Fhx of TYSHAWN and he wakes up a lot so he is hoping to get a sleep study to look into this.      Review of systems completed and unremarkable other than what is documented in HPI.      Assessment/Plan     #Depression  Doing well on wellbutrin  Increasing the dose today     #Cough  resolved   "   #Onychomycosis  Rx sent terbinafine, finished one month     #Vitamin D deficiency  Start booster dose     #Prediabetes  #Obesity  Doing well on metformin + wellbutrin     #Low libido  #ED  Check labs  Trial sildenafil     #HTN:   Controlled on low dose amlodipine and metoprolol     #GERD:   #Hiatal hernia s/p repair  Not controlled on otc omeprazole  Increasing the dosage     #Frequent waking  #Snoring:   Ordering sleep study  Would want rx through [facility]     HCM:  UTD for flu  Overdue for tdap    _____________________________________________  I spent 10 minutes in the professional and overall care of this patient.    I have communicated my name and active licensure. Telephone visit completed with realtime audio connection. Informed consent was obtained from the patient. Patient was made aware that my evaluation and diagnosis are limited due to the fact that we are not in the same room during the interview and that this is a virtual encounter that took place via telephone call. Patient verbalized understanding.

## 2025-08-05 ENCOUNTER — HOSPITAL ENCOUNTER (EMERGENCY)
Facility: HOSPITAL | Age: 30
Discharge: HOME | End: 2025-08-05
Attending: EMERGENCY MEDICINE
Payer: COMMERCIAL

## 2025-08-05 VITALS
OXYGEN SATURATION: 100 % | HEART RATE: 65 BPM | DIASTOLIC BLOOD PRESSURE: 104 MMHG | SYSTOLIC BLOOD PRESSURE: 146 MMHG | RESPIRATION RATE: 18 BRPM | TEMPERATURE: 97.8 F | HEIGHT: 75 IN | WEIGHT: 280 LBS | BODY MASS INDEX: 34.82 KG/M2

## 2025-08-05 DIAGNOSIS — H65.02 ACUTE SEROUS OTITIS MEDIA OF LEFT EAR, RECURRENCE NOT SPECIFIED: Primary | ICD-10-CM

## 2025-08-05 PROCEDURE — 99283 EMERGENCY DEPT VISIT LOW MDM: CPT | Performed by: EMERGENCY MEDICINE

## 2025-08-05 PROCEDURE — 2500000001 HC RX 250 WO HCPCS SELF ADMINISTERED DRUGS (ALT 637 FOR MEDICARE OP): Performed by: EMERGENCY MEDICINE

## 2025-08-05 PROCEDURE — 2500000002 HC RX 250 W HCPCS SELF ADMINISTERED DRUGS (ALT 637 FOR MEDICARE OP, ALT 636 FOR OP/ED): Performed by: EMERGENCY MEDICINE

## 2025-08-05 RX ORDER — IBUPROFEN 600 MG/1
600 TABLET, FILM COATED ORAL EVERY 8 HOURS PRN
Qty: 30 TABLET | Refills: 0 | Status: SHIPPED | OUTPATIENT
Start: 2025-08-05

## 2025-08-05 RX ORDER — IBUPROFEN 600 MG/1
600 TABLET, FILM COATED ORAL ONCE
Status: COMPLETED | OUTPATIENT
Start: 2025-08-05 | End: 2025-08-05

## 2025-08-05 RX ORDER — CETIRIZINE HYDROCHLORIDE 10 MG/1
10 TABLET ORAL DAILY
Status: DISCONTINUED | OUTPATIENT
Start: 2025-08-05 | End: 2025-08-05 | Stop reason: HOSPADM

## 2025-08-05 RX ORDER — AZITHROMYCIN 250 MG/1
500 TABLET, FILM COATED ORAL ONCE
Status: COMPLETED | OUTPATIENT
Start: 2025-08-05 | End: 2025-08-05

## 2025-08-05 RX ORDER — AZITHROMYCIN 500 MG/1
500 TABLET, FILM COATED ORAL DAILY
Qty: 5 TABLET | Refills: 0 | Status: SHIPPED | OUTPATIENT
Start: 2025-08-05 | End: 2025-08-10

## 2025-08-05 RX ORDER — CETIRIZINE HYDROCHLORIDE, PSEUDOEPHEDRINE HYDROCHLORIDE 5; 120 MG/1; MG/1
1 TABLET, FILM COATED, EXTENDED RELEASE ORAL 2 TIMES DAILY
Qty: 20 TABLET | Refills: 0 | Status: SHIPPED | OUTPATIENT
Start: 2025-08-05 | End: 2025-08-15

## 2025-08-05 RX ADMIN — CETIRIZINE HYDROCHLORIDE 10 MG: 10 TABLET, FILM COATED ORAL at 10:44

## 2025-08-05 RX ADMIN — AZITHROMYCIN DIHYDRATE 500 MG: 250 TABLET ORAL at 10:44

## 2025-08-05 RX ADMIN — IBUPROFEN 600 MG: 600 TABLET ORAL at 10:44

## 2025-08-05 ASSESSMENT — PAIN SCALES - GENERAL: PAINLEVEL_OUTOF10: 6

## 2025-08-05 ASSESSMENT — PAIN DESCRIPTION - DESCRIPTORS: DESCRIPTORS: ACHING

## 2025-08-05 ASSESSMENT — PAIN - FUNCTIONAL ASSESSMENT: PAIN_FUNCTIONAL_ASSESSMENT: 0-10

## 2025-08-05 ASSESSMENT — PAIN DESCRIPTION - PAIN TYPE: TYPE: ACUTE PAIN

## 2025-08-05 ASSESSMENT — PAIN DESCRIPTION - LOCATION: LOCATION: EAR

## 2025-08-05 ASSESSMENT — PAIN DESCRIPTION - ORIENTATION: ORIENTATION: LEFT

## 2025-08-05 NOTE — DISCHARGE INSTRUCTIONS
Ibuprofen, Zithromax and cetirizine or Sudafed as prescribed.    Follow-up with primary care doctor 1 week if not completely better.    Return for worsening symptoms or concerns.

## 2025-08-05 NOTE — ED PROVIDER NOTES
Emergency Department         [facility]   ED  Provider Note  8/5/2025 10:27 AM  AC10/AC10      Chief Complaint   Patient presents with    Earache        History of Present Illness:   Aneesh Loaiza is a 30 y.o. male presenting to the ED for left ear pain, beginning 2 days ago.  The complaint has been persistent, moderate in severity, and worsened by chewing and swallowing.  Patient has history of seasonal allergies and nasal congestion.  He has a mild sore throat and increasing ear pain and pressure over the past 2 days.  He denies fever chills stiff neck or focal neurodeficits.  Patient did not take his blood pressure medications this morning.      Review of Systems:   Pertinent positives and review of systems as noted above.  Remaining 10 review of systems is negative or noncontributory to today's episode of care.  Review of Systems       --------------------------------------------- PAST HISTORY ---------------------------------------------  Past Medical History:   Past Medical History:   Diagnosis Date    GERD (gastroesophageal reflux disease)     Hypertension         Past Surgical History:   Past Surgical History:   Procedure Laterality Date    CHOLECYSTECTOMY      HIATAL HERNIA REPAIR          Social History:   Social History     Social History Narrative    Not on file        Family History: family history is not on file. Unless otherwise noted, family history is non contributory    Patient's Medications   New Prescriptions    No medications on file   Previous Medications    AMLODIPINE (NORVASC) 5 MG TABLET    Take 1 tablet (5 mg) by mouth once daily.    BUPROPION XL (WELLBUTRIN XL) 300 MG 24 HR TABLET    Take 1 tablet (300 mg) by mouth once daily in the morning. Do not crush, chew, or split.    CLOTRIMAZOLE (LOTRIMIN) 1 % CREAM    Apply topically 2 times a day. apply to affected area    ERGOCALCIFEROL (VITAMIN D-2) 1250 MCG (50,000 UNITS) CAPSULE    Take 1 capsule (1.25 mg) by mouth 1 (one) time per  "week.    METFORMIN (GLUCOPHAGE) 500 MG TABLET    Take 1 tablet by mouth once daily with breakfast    METOPROLOL SUCCINATE XL (TOPROL-XL) 25 MG 24 HR TABLET    Take 1 tablet (25 mg) by mouth once daily. Do not crush or chew.    OMEPRAZOLE (PRILOSEC) 40 MG DR CAPSULE    Take 1 capsule (40 mg) by mouth once daily in the morning. Take before meals. Do not crush or chew.    SILDENAFIL (VIAGRA) 25 MG TABLET    Take 1 tablet (25 mg) by mouth once daily as needed for erectile dysfunction.    TERBINAFINE (LAMISIL) 250 MG TABLET    Take 1 tablet (250 mg) by mouth once daily.   Modified Medications    No medications on file   Discontinued Medications    No medications on file      The patient’s home medications have been reviewed.    Allergies: Amoxicillin-pot clavulanate    -------------------------------------------------- RESULTS -------------------------------------------------  All laboratory and radiology results have been personally reviewed by myself   LABS:  Labs Reviewed - No data to display      RADIOLOGY:  Interpreted by Radiologist.  No orders to display       No results found for this or any previous visit (from the past 4464 hours).  ------------------------- NURSING NOTES AND VITALS REVIEWED ---------------------------   The nursing notes within the ED encounter and vital signs as below have been reviewed.   BP (!) 146/104   Pulse 65   Temp 36.6 °C (97.8 °F) (Temporal)   Resp 18   Ht 1.905 m (6' 3\")   Wt 127 kg (280 lb)   SpO2 100%   BMI 35.00 kg/m²   Oxygen Saturation Interpretation: Normal      ---------------------------------------------------PHYSICAL EXAM--------------------------------------  Physical Exam   Constitutional/General: Alert,  well appearing, non toxic in NAD  Head: Normocephalic and atraumatic  Eyes: PERRL, EOMI, conjunctiva normal, sclera non icteric  Ears: Left tympanic membrane is red and bulging, right tympanic membrane is normal  Mouth: Oropharynx clear, handling secretions, no " trismus, no asymmetry of the posterior oropharynx or uvular edema  Neck: Supple, full ROM, non tender to palpation in the midline, no stridor, no crepitus, no meningeal signs  Respiratory: Lungs clear to auscultation bilaterally, no wheezes, rales, or rhonchi. Not in respiratory distress  Cardiovascular:  Regular rate. Regular rhythm. No murmurs, gallops, or rubs. 2+ distal pulses  Chest: No chest wall tenderness  GI:  Abdomen Soft, Non tender, Non distended.  +BS. No organomegaly, no palpable masses,  No rebound, guarding, or rigidity.   Musculoskeletal: Moves all extremities x 4. Warm and well perfused, no clubbing, cyanosis, or edema. Capillary refill <3 seconds  Integument: skin warm and dry. No rashes.   Lymphatic: no lymphadenopathy noted  Neurologic: No focal deficits, symmetric strength 5/5 in the upper and lower extremities bilaterally  Psychiatric: Normal Affect    Procedures    ------------------------------ ED COURSE/MEDICAL DECISION MAKING----------------------  Diagnoses as of 08/05/25 1041   Acute serous otitis media of left ear, recurrence not specified      Patient has acute left serous otitis media.  His right tympanic membrane appears normal.  He is moderately hypertensive did not take his blood pressure pills today.  I have suggested he take his blood pressure pills regularly prevent the long-term complications of hypertension.    Differential Diagnosis: Otitis media, otitis externa, eustachian tube dysfunction    Medical Decision Making:   Discharge    Diagnoses as of 08/05/25 1041   Acute serous otitis media of left ear, recurrence not specified        Counseling:   The emergency provider has spoken with the patient and discussed today’s results, in addition to providing specific details for the plan of care and counseling regarding the diagnosis and prognosis.  Questions are answered at this time and they are agreeable with the plan.      --------------------------------- IMPRESSION AND  DISPOSITION ---------------------------------        IMPRESSION  1. Acute serous otitis media of left ear, recurrence not specified        DISPOSITION  Disposition: Discharge to home  Patient condition is fair      Billing Provider Critical Care Time: 0 minutes       Kev Perales MD  08/05/25 1040     [Negative] : Heme/Lymph [Fever] : no fever [Headache] : no headache [Chills] : no chills [Feeling Fatigued] : not feeling fatigued [Blurry Vision] : no blurred vision [Seeing Double (Diplopia)] : no diplopia [Eye Pain] : no eye pain [Earache] : no earache [Discharge From Ears] : no discharge from the ears [Hearing Loss] : no hearing loss [Mouth Sores] : no mouth sores [Sore Throat] : no sore throat [Sinus Pressure] : no sinus pressure [Tinnitus] : no tinnitus [Vertigo] : no vertigo [SOB] : no shortness of breath [Dyspnea on exertion] : not dyspnea during exertion [Chest Discomfort] : no chest discomfort [Lower Ext Edema] : no extremity edema [Leg Claudication] : no intermittent leg claudication [Palpitations] : no palpitations [Orthopnea] : no orthopnea [PND] : no PND [Syncope] : no syncope [Cough] : no cough [Wheezing] : no wheezing [Coughing Up Blood] : no hemoptysis [Snoring] : no snoring [Abdominal Pain] : no abdominal pain [Nausea] : no nausea [Vomiting] : no vomiting [Heartburn] : no heartburn [Change in Appetite] : no change in appetite [Change In The Stool] : no change in stool [Dysphagia] : no dysphagia [Diarrhea] : diarrhea [Constipation] : no constipation [Blood in stool] : no blood in stoo [Urinary Frequency] : no change in urinary frequency [Hematuria] : no hematuria [Pain During Urination] : no dysuria [Nocturia] : no nocturia [Joint Pain] : no joint pain [Joint Swelling] : no joint swelling [Joint Stiffness] : no joint stiffness [Muscle Cramps] : no muscle cramps [Myalgia] : no myalgia [Rash] : no rash [Itching] : no itching [Change In Color Of Skin] : change in skin color [Skin Lesions] : no skin lesions [Telangiectasias] : no telangiectasias [Dizziness] : no dizziness [Tremor] : no tremor was seen [Numbness (Hypoesthesia)] : no numbness [Convulsions] : no convulsions [Tingling (Paresthesia)] : no tingling [Weakness] : no weakness [Limb Weakness (Paresis)] : no limb weakness (Paresis) [Speech Disturbance] : no speech disturbance [Confusion] : no confusion was observed [Memory Lapses Or Loss] : no memory lapses or loss [Depression] : no depression [Anxiety] : no anxiety [Under Stress] : not under stress [Suicidal] : not suicidal [Easy Bleeding] : no tendency for easy bleeding [Swollen Glands] : no swollen glands [Easy Bruising] : no tendency for easy bruising

## 2025-08-26 DIAGNOSIS — R73.03 PREDIABETES: ICD-10-CM

## 2025-08-26 RX ORDER — METFORMIN HYDROCHLORIDE 500 MG/1
500 TABLET ORAL
Qty: 30 TABLET | Refills: 0 | Status: SHIPPED | OUTPATIENT
Start: 2025-08-26

## 2025-10-02 ENCOUNTER — APPOINTMENT (OUTPATIENT)
Dept: PRIMARY CARE | Facility: CLINIC | Age: 30
End: 2025-10-02
Payer: COMMERCIAL